# Patient Record
Sex: FEMALE | Race: WHITE | NOT HISPANIC OR LATINO | Employment: UNEMPLOYED | URBAN - NONMETROPOLITAN AREA
[De-identification: names, ages, dates, MRNs, and addresses within clinical notes are randomized per-mention and may not be internally consistent; named-entity substitution may affect disease eponyms.]

---

## 2020-06-11 ENCOUNTER — TRANSCRIBE ORDERS (OUTPATIENT)
Dept: ADMINISTRATIVE | Facility: HOSPITAL | Age: 60
End: 2020-06-11

## 2020-06-11 DIAGNOSIS — Z01.818 PREOP TESTING: Primary | ICD-10-CM

## 2020-06-16 ENCOUNTER — LAB (OUTPATIENT)
Dept: LAB | Facility: HOSPITAL | Age: 60
End: 2020-06-16

## 2020-06-16 PROCEDURE — U0003 INFECTIOUS AGENT DETECTION BY NUCLEIC ACID (DNA OR RNA); SEVERE ACUTE RESPIRATORY SYNDROME CORONAVIRUS 2 (SARS-COV-2) (CORONAVIRUS DISEASE [COVID-19]), AMPLIFIED PROBE TECHNIQUE, MAKING USE OF HIGH THROUGHPUT TECHNOLOGIES AS DESCRIBED BY CMS-2020-01-R: HCPCS | Performed by: PAIN MEDICINE

## 2020-06-17 LAB
COVID LABCORP PRIORITY: NORMAL
SARS-COV-2 RNA RESP QL NAA+PROBE: NOT DETECTED

## 2020-08-31 ENCOUNTER — TRANSCRIBE ORDERS (OUTPATIENT)
Dept: ADMINISTRATIVE | Facility: HOSPITAL | Age: 60
End: 2020-08-31

## 2020-08-31 DIAGNOSIS — Z01.818 PREOP TESTING: Primary | ICD-10-CM

## 2023-07-03 PROBLEM — I50.9 CHRONIC CONGESTIVE HEART FAILURE: Status: ACTIVE | Noted: 2023-07-03

## 2023-07-03 PROBLEM — E78.2 MIXED HYPERLIPIDEMIA: Status: ACTIVE | Noted: 2023-07-03

## 2023-07-03 PROBLEM — R06.09 DYSPNEA ON EXERTION: Status: ACTIVE | Noted: 2023-07-03

## 2023-08-11 ENCOUNTER — OFFICE VISIT (OUTPATIENT)
Dept: CARDIOLOGY | Facility: CLINIC | Age: 63
End: 2023-08-11
Payer: COMMERCIAL

## 2023-08-11 VITALS
SYSTOLIC BLOOD PRESSURE: 120 MMHG | HEIGHT: 64 IN | WEIGHT: 224 LBS | OXYGEN SATURATION: 98 % | HEART RATE: 87 BPM | BODY MASS INDEX: 38.24 KG/M2 | DIASTOLIC BLOOD PRESSURE: 62 MMHG

## 2023-08-11 DIAGNOSIS — I50.9 CHRONIC CONGESTIVE HEART FAILURE, UNSPECIFIED HEART FAILURE TYPE: ICD-10-CM

## 2023-08-11 DIAGNOSIS — R06.09 DYSPNEA ON EXERTION: ICD-10-CM

## 2023-08-11 DIAGNOSIS — E78.2 MIXED HYPERLIPIDEMIA: ICD-10-CM

## 2023-08-11 DIAGNOSIS — I50.21 ACUTE SYSTOLIC CHF (CONGESTIVE HEART FAILURE): Primary | ICD-10-CM

## 2023-08-11 PROCEDURE — 99214 OFFICE O/P EST MOD 30 MIN: CPT | Performed by: EMERGENCY MEDICINE

## 2023-08-11 RX ORDER — ASPIRIN 81 MG/1
81 TABLET ORAL DAILY
COMMUNITY

## 2023-08-11 RX ORDER — SACUBITRIL AND VALSARTAN 24; 26 MG/1; MG/1
1 TABLET, FILM COATED ORAL 2 TIMES DAILY
Qty: 180 TABLET | Refills: 3 | Status: SHIPPED | OUTPATIENT
Start: 2023-08-11

## 2023-08-11 NOTE — PROGRESS NOTES
Subjective:     Encounter Date:08/11/2023      Patient ID: Tory Shaw is a 62 y.o. female.    Chief Complaint:  History of Present Illness    Tory Shaw is a 62-year-old female patient who presents to the clinic today for 1-month follow-up for congestive heart failure. She is accompanied by an adult female.     Ms. Shaw initially presented on 06/30/2023 due to chest pain, dyspnea, and dizziness and lightheaded with movement. She had a prior stay at [facility] for wheezing and dyspnea. She underwent testing at that time and was told her heart was stable and she was treated for asthma. Following her hospital stay, lab work completed by her primary care provider suggested pericardial perfusion. She has been feeling good since her last visit.     She underwent an echocardiogram on 07/21/2023 which was discussed in detail with her today. Her ejection fraction is 40 to 45 percent and reveals both systolic and diastolic heart failure. Her stress test done at [facility] showed low risk test but her ejection fraction before stress was good and then after the stress dropped from 60s down into the 40s percent.      She is still experiencing chest pain with pressure and squeezing when she is active. She will have to sit and wait for it to finally resolve. She notes that sometimes when she is eating it feels like her food gets stuck and she gets a real sharp pain and feels like she is having a heart attack.     She reports that she gets tired easily and experiences shortness of breath. She notes that she walks a lot and has to stop and rest because she gets winded and tired.     She had recent lab work done by her primary care provider and her cholesterol levels were within normal ranges.     At her last visit she was prescribed Coreg 3.125 mg twice daily and [id number] mg daily. She utilizes valsartan 80 mg taking 2 tablets once daily, as well as aspirin 81 mg  once daily. She also takes Lipitor 10 mg daily for hyperlipidemia.         Review of Systems   Constitutional: Negative for diaphoresis, fever and malaise/fatigue.        Tires easily.   HENT:  Negative for congestion.    Eyes:  Negative for vision loss in left eye and vision loss in right eye.   Cardiovascular:  Positive for chest pain and dyspnea on exertion. Negative for claudication, irregular heartbeat, leg swelling, orthopnea, palpitations and syncope.   Respiratory:  Negative for cough, shortness of breath and wheezing.    Hematologic/Lymphatic: Negative for adenopathy.   Skin:  Negative for rash.   Musculoskeletal:  Negative for joint pain and joint swelling.   Gastrointestinal:  Negative for abdominal pain, diarrhea, nausea and vomiting.   Neurological:  Negative for excessive daytime sleepiness, dizziness, focal weakness, light-headedness, numbness and weakness.   Psychiatric/Behavioral:  Negative for depression. The patient does not have insomnia.          Current Outpatient Medications:     atorvastatin (LIPITOR) 10 MG tablet, 1 tablet., Disp: , Rfl:     carvedilol (COREG) 3.125 MG tablet, Take 1 tablet by mouth 2 (Two) Times a Day., Disp: 60 tablet, Rfl: 11    escitalopram (LEXAPRO) 10 MG tablet, Take 1 tablet by mouth Daily., Disp: , Rfl:     HYDROcodone-acetaminophen (NORCO)  MG per tablet, Take 1 tablet every 8 hours by oral route., Disp: , Rfl:     spironolactone (ALDACTONE) 25 MG tablet, Take 1 tablet by mouth Daily., Disp: 90 tablet, Rfl: 3    Symbicort 160-4.5 MCG/ACT inhaler, , Disp: , Rfl:     aspirin 81 MG EC tablet, Take 1 tablet by mouth Daily., Disp: , Rfl:     sacubitril-valsartan (Entresto) 24-26 MG tablet, Take 1 tablet by mouth 2 (Two) Times a Day., Disp: 180 tablet, Rfl: 3       Objective:      Vitals:    08/11/23 1032   BP: 120/62   Pulse: 87   SpO2: 98%     Vitals and nursing note reviewed.   Constitutional:       Appearance: Normal and healthy appearance. Well-developed and  not in distress.   Eyes:      Extraocular Movements: Extraocular movements intact.      Pupils: Pupils are equal, round, and reactive to light.   HENT:      Head: Normocephalic and atraumatic.    Mouth/Throat:      Pharynx: Oropharynx is clear.   Neck:      Vascular: JVD normal.      Trachea: Trachea normal.   Pulmonary:      Effort: Pulmonary effort is normal.      Breath sounds: Normal breath sounds. No wheezing. No rhonchi. No rales.   Cardiovascular:      PMI at left midclavicular line. Normal rate. Regular rhythm. Normal S1. Normal S2.       Murmurs: There is a grade 2/6 systolic murmur.      No gallop.  No click. No rub.   Pulses:     Dorsalis pedis: 2+ bilaterally.     Posterior tibial: 2+ bilaterally.  Abdominal:      General: Bowel sounds are normal.      Palpations: Abdomen is soft.      Tenderness: There is no abdominal tenderness.   Musculoskeletal: Normal range of motion.      Cervical back: Normal range of motion and neck supple. Skin:     General: Skin is warm and dry.      Capillary Refill: Capillary refill takes less than 2 seconds.   Feet:      Right foot:      Skin integrity: Skin integrity normal.      Left foot:      Skin integrity: Skin integrity normal.   Neurological:      Mental Status: Alert and oriented to person, place and time.      Cranial Nerves: Cranial nerves are intact.      Sensory: Sensation is intact.      Motor: Motor function is intact.      Coordination: Coordination is intact.   Psychiatric:         Speech: Speech normal.         Behavior: Behavior is cooperative.       Lab Review:       Procedures      Assessment/Plan:     Problem List Items Addressed This Visit          Cardiac and Vasculature    Dyspnea on exertion    Mixed hyperlipidemia    Chronic congestive heart failure    Relevant Medications    sacubitril-valsartan (Entresto) 24-26 MG tablet    Acute systolic CHF (congestive heart failure) - Primary    Relevant Medications    sacubitril-valsartan (Entresto) 24-26 MG  tablet    Other Relevant Orders    Case Request Cath Lab: Left Heart Cath (Completed)       1. Acute systolic congestive heart failure, primary  2. Chest pain  3.Dyspnea with exertion  4. Hyperlipidemia    The patient's recent echocardiogram showed left ventricular systolic function is mildly decreased. Left ventricular ejection fraction appears to be 41 - 45%. The left ventricular cavity is mildly dilated. Left ventricular wall thickness is consistent with mild concentric hypertrophy. There is left ventricular global hypokinesis noted. Left ventricular diastolic function is consistent with (grade I) impaired relaxation. The left atrial cavity is mildly dilated. No hemodynamically significant valvular disease. Her prior stress test from [facility] showed low risk test, however, her ejection fraction before stress was good and in the range of 60 percent, but after the stress dropped into the 40s percent. I feel there could be a possible blockage. Discussed cardiac catheterization with her today. The patient has consented to the left heart catheterization and has been scheduled for Friday, 08/18/2023.     She will continue Coreg 3.125 mg twice daily and spironolactone (Aldactone) 25 mg daily. She will discontinue valsartan. She will start Entresto. She was given a 3-month supply of samples for Entresto 49-51 mg in-office today. She will start with a lower dose, so she will cut these in half and take 0.5 tablet in the morning and 0.5 tablet at night. A prescription has been sent for sacubitril-valsartan (Entresto) 24-26 mg tablet to be taken twice daily. She will continue aspirin 81 mg daily and Lipitor 10 mg daily.       Recommendations/plans:  Patient will follow up in 1 month.    Transcribed from ambient dictation for Elton Key DO by Brigitte Manning.  08/11/23   12:40 CDT    Patient or patient representative verbalized consent to the visit recording.  I have personally performed the  services described in this document as transcribed by the above individual, and it is both accurate and complete.  Elton Key,   8/22/2023  16:11 CDT

## 2023-08-18 ENCOUNTER — HOSPITAL ENCOUNTER (OUTPATIENT)
Facility: HOSPITAL | Age: 63
Setting detail: HOSPITAL OUTPATIENT SURGERY
Discharge: HOME | End: 2023-08-18
Attending: EMERGENCY MEDICINE | Admitting: EMERGENCY MEDICINE
Payer: COMMERCIAL

## 2023-08-18 VITALS
WEIGHT: 224.87 LBS | BODY MASS INDEX: 38.39 KG/M2 | OXYGEN SATURATION: 99 % | HEART RATE: 94 BPM | SYSTOLIC BLOOD PRESSURE: 106 MMHG | HEIGHT: 64 IN | TEMPERATURE: 98.3 F | RESPIRATION RATE: 18 BRPM | DIASTOLIC BLOOD PRESSURE: 74 MMHG

## 2023-08-18 DIAGNOSIS — R00.2 PALPITATIONS: Primary | ICD-10-CM

## 2023-08-18 DIAGNOSIS — I50.21 ACUTE SYSTOLIC CHF (CONGESTIVE HEART FAILURE): ICD-10-CM

## 2023-08-18 LAB
ANION GAP SERPL CALCULATED.3IONS-SCNC: 11 MMOL/L (ref 5–15)
BUN SERPL-MCNC: 16 MG/DL (ref 8–23)
BUN/CREAT SERPL: 22.9 (ref 7–25)
CALCIUM SPEC-SCNC: 9.6 MG/DL (ref 8.6–10.5)
CHLORIDE SERPL-SCNC: 102 MMOL/L (ref 98–107)
CO2 SERPL-SCNC: 23 MMOL/L (ref 22–29)
CREAT SERPL-MCNC: 0.7 MG/DL (ref 0.57–1)
DEPRECATED RDW RBC AUTO: 45.2 FL (ref 37–54)
EGFRCR SERPLBLD CKD-EPI 2021: 97.3 ML/MIN/1.73
ERYTHROCYTE [DISTWIDTH] IN BLOOD BY AUTOMATED COUNT: 13.5 % (ref 12.3–15.4)
GLUCOSE SERPL-MCNC: 100 MG/DL (ref 65–99)
HCT VFR BLD AUTO: 38.9 % (ref 34–46.6)
HGB BLD-MCNC: 12.1 G/DL (ref 12–15.9)
INR PPP: 0.91 (ref 0.91–1.09)
MCH RBC QN AUTO: 28.6 PG (ref 26.6–33)
MCHC RBC AUTO-ENTMCNC: 31.1 G/DL (ref 31.5–35.7)
MCV RBC AUTO: 92 FL (ref 79–97)
PLATELET # BLD AUTO: 416 10*3/MM3 (ref 140–450)
PMV BLD AUTO: 9.7 FL (ref 6–12)
POTASSIUM SERPL-SCNC: 4.5 MMOL/L (ref 3.5–5.2)
PROTHROMBIN TIME: 12.3 SECONDS (ref 11.8–14.8)
RBC # BLD AUTO: 4.23 10*6/MM3 (ref 3.77–5.28)
SODIUM SERPL-SCNC: 136 MMOL/L (ref 136–145)
WBC NRBC COR # BLD: 7.93 10*3/MM3 (ref 3.4–10.8)

## 2023-08-18 PROCEDURE — 25010000002 DIPHENHYDRAMINE PER 50 MG: Performed by: EMERGENCY MEDICINE

## 2023-08-18 PROCEDURE — 80048 BASIC METABOLIC PNL TOTAL CA: CPT | Performed by: EMERGENCY MEDICINE

## 2023-08-18 PROCEDURE — 25510000001 IOPAMIDOL PER 1 ML: Performed by: EMERGENCY MEDICINE

## 2023-08-18 PROCEDURE — 25010000002 FENTANYL CITRATE (PF) 50 MCG/ML SOLUTION: Performed by: EMERGENCY MEDICINE

## 2023-08-18 PROCEDURE — 85610 PROTHROMBIN TIME: CPT | Performed by: EMERGENCY MEDICINE

## 2023-08-18 PROCEDURE — 93458 L HRT ARTERY/VENTRICLE ANGIO: CPT | Performed by: EMERGENCY MEDICINE

## 2023-08-18 PROCEDURE — 85027 COMPLETE CBC AUTOMATED: CPT | Performed by: EMERGENCY MEDICINE

## 2023-08-18 PROCEDURE — 99152 MOD SED SAME PHYS/QHP 5/>YRS: CPT | Performed by: EMERGENCY MEDICINE

## 2023-08-18 PROCEDURE — C1769 GUIDE WIRE: HCPCS | Performed by: EMERGENCY MEDICINE

## 2023-08-18 PROCEDURE — 25010000002 MIDAZOLAM PER 1 MG: Performed by: EMERGENCY MEDICINE

## 2023-08-18 PROCEDURE — C1894 INTRO/SHEATH, NON-LASER: HCPCS | Performed by: EMERGENCY MEDICINE

## 2023-08-18 PROCEDURE — 25010000002 HEPARIN (PORCINE) 2000-0.9 UNIT/L-% SOLUTION: Performed by: EMERGENCY MEDICINE

## 2023-08-18 PROCEDURE — 99153 MOD SED SAME PHYS/QHP EA: CPT | Performed by: EMERGENCY MEDICINE

## 2023-08-18 PROCEDURE — 25010000002 HEPARIN (PORCINE) PER 1000 UNITS: Performed by: EMERGENCY MEDICINE

## 2023-08-18 RX ORDER — HEPARIN SODIUM 1000 [USP'U]/ML
INJECTION, SOLUTION INTRAVENOUS; SUBCUTANEOUS
Status: DISCONTINUED | OUTPATIENT
Start: 2023-08-18 | End: 2023-08-18 | Stop reason: HOSPADM

## 2023-08-18 RX ORDER — LIDOCAINE HYDROCHLORIDE 20 MG/ML
INJECTION, SOLUTION INFILTRATION; PERINEURAL
Status: DISCONTINUED | OUTPATIENT
Start: 2023-08-18 | End: 2023-08-18 | Stop reason: HOSPADM

## 2023-08-18 RX ORDER — DIPHENHYDRAMINE HYDROCHLORIDE 50 MG/ML
INJECTION INTRAMUSCULAR; INTRAVENOUS
Status: DISCONTINUED | OUTPATIENT
Start: 2023-08-18 | End: 2023-08-18 | Stop reason: HOSPADM

## 2023-08-18 RX ORDER — HEPARIN SODIUM 200 [USP'U]/100ML
INJECTION, SOLUTION INTRAVENOUS
Status: DISCONTINUED | OUTPATIENT
Start: 2023-08-18 | End: 2023-08-18 | Stop reason: HOSPADM

## 2023-08-18 RX ORDER — FENTANYL CITRATE 50 UG/ML
INJECTION, SOLUTION INTRAMUSCULAR; INTRAVENOUS
Status: DISCONTINUED | OUTPATIENT
Start: 2023-08-18 | End: 2023-08-18 | Stop reason: HOSPADM

## 2023-08-18 RX ORDER — MIDAZOLAM HYDROCHLORIDE 1 MG/ML
INJECTION INTRAMUSCULAR; INTRAVENOUS
Status: DISCONTINUED | OUTPATIENT
Start: 2023-08-18 | End: 2023-08-18 | Stop reason: HOSPADM

## 2023-08-18 RX ORDER — VERAPAMIL HYDROCHLORIDE 2.5 MG/ML
INJECTION, SOLUTION INTRAVENOUS
Status: DISCONTINUED | OUTPATIENT
Start: 2023-08-18 | End: 2023-08-18 | Stop reason: HOSPADM

## 2023-08-18 RX ORDER — NITROGLYCERIN 0.4 MG/1
0.4 TABLET SUBLINGUAL
Status: CANCELLED | OUTPATIENT
Start: 2023-08-18

## 2023-08-18 RX ORDER — SODIUM CHLORIDE 9 MG/ML
100 INJECTION, SOLUTION INTRAVENOUS CONTINUOUS
Status: CANCELLED | OUTPATIENT
Start: 2023-08-18

## 2023-08-18 RX ORDER — ACETAMINOPHEN 325 MG/1
650 TABLET ORAL EVERY 4 HOURS PRN
Status: CANCELLED | OUTPATIENT
Start: 2023-08-18

## 2023-08-18 NOTE — H&P
Patient seen and examined at bedside.  The history and physical not changed as below.    We will be performing a diagnostic left heart catheterization today with possible intervention based on findings.    Risk, benefits and alternatives were explained to the patient and she wished to proceed.      Patient ID: Tory Shaw is a 62 y.o. female.     Chief Complaint:  History of Present Illness     Tory Shaw is a 62-year-old female patient who presents to the clinic today for 1-month follow-up for congestive heart failure. She is accompanied by an adult female.      Ms. Shaw initially presented on 06/30/2023 due to chest pain, dyspnea, and dizziness and lightheaded with movement. She had a prior stay at [facility] for wheezing and dyspnea. She underwent testing at that time and was told her heart was stable and she was treated for asthma. Following her hospital stay, lab work completed by her primary care provider suggested pericardial perfusion. She has been feeling good since her last visit.      She underwent an echocardiogram on 07/21/2023 which was discussed in detail with her today. Her ejection fraction is 40 to 45 percent and reveals both systolic and diastolic heart failure. Her stress test done at [facility] showed low risk test but her ejection fraction before stress was good and then after the stress dropped from 60s down into the 40s percent.       She is still experiencing chest pain with pressure and squeezing when she is active. She will have to sit and wait for it to finally resolve. She notes that sometimes when she is eating it feels like her food gets stuck and she gets a real sharp pain and feels like she is having a heart attack.      She reports that she gets tired easily and experiences shortness of breath. She notes that she walks a lot and has to stop and rest because she gets winded and tired.      She had recent lab work done by her primary  care provider and her cholesterol levels were within normal ranges.      At her last visit she was prescribed Coreg 3.125 mg twice daily and [id number] mg daily. She utilizes valsartan 80 mg taking 2 tablets once daily, as well as aspirin 81 mg once daily. She also takes Lipitor 10 mg daily for hyperlipidemia.            Review of Systems   Constitutional: Negative for diaphoresis, fever and malaise/fatigue.        Tires easily.   HENT:  Negative for congestion.    Eyes:  Negative for vision loss in left eye and vision loss in right eye.   Cardiovascular:  Positive for chest pain and dyspnea on exertion. Negative for claudication, irregular heartbeat, leg swelling, orthopnea, palpitations and syncope.   Respiratory:  Negative for cough, shortness of breath and wheezing.    Hematologic/Lymphatic: Negative for adenopathy.   Skin:  Negative for rash.   Musculoskeletal:  Negative for joint pain and joint swelling.   Gastrointestinal:  Negative for abdominal pain, diarrhea, nausea and vomiting.   Neurological:  Negative for excessive daytime sleepiness, dizziness, focal weakness, light-headedness, numbness and weakness.   Psychiatric/Behavioral:  Negative for depression. The patient does not have insomnia.             Current Outpatient Medications:     atorvastatin (LIPITOR) 10 MG tablet, 1 tablet., Disp: , Rfl:     carvedilol (COREG) 3.125 MG tablet, Take 1 tablet by mouth 2 (Two) Times a Day., Disp: 60 tablet, Rfl: 11    escitalopram (LEXAPRO) 10 MG tablet, Take 1 tablet by mouth Daily., Disp: , Rfl:     HYDROcodone-acetaminophen (NORCO)  MG per tablet, Take 1 tablet every 8 hours by oral route., Disp: , Rfl:     spironolactone (ALDACTONE) 25 MG tablet, Take 1 tablet by mouth Daily., Disp: 90 tablet, Rfl: 3    Symbicort 160-4.5 MCG/ACT inhaler, , Disp: , Rfl:     aspirin 81 MG EC tablet, Take 1 tablet by mouth Daily., Disp: , Rfl:     sacubitril-valsartan (Entresto) 24-26 MG tablet, Take 1 tablet by mouth  2 (Two) Times a Day., Disp: 180 tablet, Rfl: 3           Objective:      Objective          Vitals:     08/11/23 1032   BP: 120/62   Pulse: 87   SpO2: 98%      Vitals and nursing note reviewed.   Constitutional:       Appearance: Normal and healthy appearance. Well-developed and not in distress.   Eyes:      Extraocular Movements: Extraocular movements intact.      Pupils: Pupils are equal, round, and reactive to light.   HENT:      Head: Normocephalic and atraumatic.    Mouth/Throat:      Pharynx: Oropharynx is clear.   Neck:      Vascular: JVD normal.      Trachea: Trachea normal.   Pulmonary:      Effort: Pulmonary effort is normal.      Breath sounds: Normal breath sounds. No wheezing. No rhonchi. No rales.   Cardiovascular:      PMI at left midclavicular line. Normal rate. Regular rhythm. Normal S1. Normal S2.       Murmurs: There is a grade 2/6 systolic murmur.      No gallop.  No click. No rub.   Pulses:     Dorsalis pedis: 2+ bilaterally.     Posterior tibial: 2+ bilaterally.  Abdominal:      General: Bowel sounds are normal.      Palpations: Abdomen is soft.      Tenderness: There is no abdominal tenderness.   Musculoskeletal: Normal range of motion.      Cervical back: Normal range of motion and neck supple. Skin:     General: Skin is warm and dry.      Capillary Refill: Capillary refill takes less than 2 seconds.   Feet:      Right foot:      Skin integrity: Skin integrity normal.      Left foot:      Skin integrity: Skin integrity normal.   Neurological:      Mental Status: Alert and oriented to person, place and time.      Cranial Nerves: Cranial nerves are intact.      Sensory: Sensation is intact.      Motor: Motor function is intact.      Coordination: Coordination is intact.   Psychiatric:         Speech: Speech normal.         Behavior: Behavior is cooperative.         Lab Review:         Procedures        Assessment/Plan:      Problem List Items Addressed This Visit                  Cardiac and  Vasculature     Dyspnea on exertion     Mixed hyperlipidemia     Chronic congestive heart failure     Relevant Medications     sacubitril-valsartan (Entresto) 24-26 MG tablet     Acute systolic CHF (congestive heart failure) - Primary     Relevant Medications     sacubitril-valsartan (Entresto) 24-26 MG tablet     Other Relevant Orders     Case Request Cath Lab: Left Heart Cath (Completed)         1. Acute systolic congestive heart failure, primary  2. Chest pain  3.Dyspnea with exertion  4. Hyperlipidemia     The patient's recent echocardiogram showed left ventricular systolic function is mildly decreased. Left ventricular ejection fraction appears to be 41 - 45%. The left ventricular cavity is mildly dilated. Left ventricular wall thickness is consistent with mild concentric hypertrophy. There is left ventricular global hypokinesis noted. Left ventricular diastolic function is consistent with (grade I) impaired relaxation. The left atrial cavity is mildly dilated. No hemodynamically significant valvular disease. Her prior stress test from [facility] showed low risk test, however, her ejection fraction before stress was good and in the range of 60 percent, but after the stress dropped into the 40s percent. I feel there could be a possible blockage. Discussed cardiac catheterization with her today. The patient has consented to the left heart catheterization and has been scheduled for Friday, 08/18/2023.      She will continue Coreg 3.125 mg twice daily and spironolactone (Aldactone) 25 mg daily. She will discontinue valsartan. She will start Entresto. She was given a 3-month supply of samples for Entresto 49-51 mg in-office today. She will start with a lower dose, so she will cut these in half and take 0.5 tablet in the morning and 0.5 tablet at night. A prescription has been sent for sacubitril-valsartan (Entresto) 24-26 mg tablet to be taken twice daily. She will continue aspirin 81 mg daily  and Lipitor 10 mg daily.         Recommendations/plans:  Patient will follow up in 1 month.     Transcribed from ambient dictation for Elton Key DO by Brigitte Manning.  08/11/23   12:40 CDT     Patient or patient representative verbalized consent to the visit recording.  I have personally performed the services described in this document as transcribed by the above individual, and it is both accurate and complete.  Elton Key DO  8/22/2023  16:11 CDT

## 2023-08-18 NOTE — Clinical Note
Hemostasis started on the right radial artery. Radial compression device applied to vessel. Hemostasis achieved successfully.

## 2023-08-18 NOTE — OP NOTE
[facility]    Date of procedure: 8/18/2023     Procedures performed:     1.  Access to the right radial artery via modified Seldinger technique  2.  Left heart catheterization with retrograde crossing rebound to the left ventricle  3.  LV ventriculography  4.  Selective bilateral coronary angiography  5.  Conscious sedation with continuous hemodynamic monitoring for 25 minutes  6.  Patent hemostasis achieved in the right radial artery access site using a TR band        Risk, Benefits, and Alternatives discussed with the patient and/or family.  Plan is for moderate sedation, and the patient agrees to proceed with the procedure.  An immediate assessment was done prior to the administration of moderate sedation        Indication: New onset systolic heart failure  Premedication: Versed, Fentanyl  Contrast: Isovue 114 cc  Radiation: Flouro time= 4.2 minutes. Air Kerma= 253 mGy  Catheters: 5F TIG, pigtail    Procedural details:    The patient was brought to the cath lab and prepped and draped in the usual fashion.  Access was obtained in the right radial artery using a modified Seldinger technique.  A 6 [place] sheath was placed into the artery and flushed.  Next, TIG catheter was inserted and used to engage the left main and right coronary arteries and selective bilateral coronary angiography was performed in multiple views.  Next, pigtail catheter was inserted and used to cross aortic valve to the left ventricle pressure recorded LV ventriculography was performed.  This catheter was then withdrawn back across the aortic valve and again pressures were recorded.  Everything was then withdrawn through the sheath and the sheath was flushed.  Patent hemostasis was achieved in the right radial artery access site using a TR band.  Patient tolerated procedure well without any complications.  She left the Cath Lab in stable condition.      I supervised the administration of conscious sedation by nursing  staff throughout the case.  First dose was given at 1543 and the end of my face-to-face encounter was at 1610, accounting for a total of 25 minutes of supervision.  During the case, continuous pulse oximetry, heart rate, blood pressure, and patient status were monitored.     Findings:    Hemodynamics:    Please see dedicated hemodynamics report for pressures    Left ventriculography:  1. The contractility of the left ventricle is mildly reduced.  Estimated ejection fraction 45%.  2.  No significant gradient across the aortic valve on pullback    Selective coronary angiography:     Left main: Left main is a large-caliber vessel that bifurcates into the LAD and left circumflex coronary arteries, no angiographic evidence of stenosis, ANGEL LUIS-3 flow    LAD: The LAD is a large-caliber vessel with mild disease noted in the midsegment, ANGEL LUIS-3 flow    Diagonals: Mild to moderate caliber vessels with no significant disease    Left circumflex: Large caliber vessel with no angiographic evidence of stenosis, ANGEL LUIS-3 flow    Obtuse marginals: The first OM is moderate caliber with no significant disease, the second OM is large caliber with no significant disease    RCA: Large caliber vessel with no significant disease, ANGEL LUIS-3 flow    PDA/BALDOMERO: Moderate caliber with no significant disease      Estimated Blood Loss: Minimal    Specimens: None    Complications: None     Impression:  1.  Mild, nonobstructive coronary artery disease as described above  2.  Systolic heart failure, not secondary to ischemia  3.  Hyperlipidemia  4.  Episodes of chest pain and dyspnea on exertion     Plan:   1.  TR band off in 2 hours, discharge home later today  2.  Follow-up in the office to continue to optimize her medical regimen for her systolic CHF    Elton Key,   Interventional cardiology  [facility]  August 18, 2023

## 2023-08-22 PROBLEM — I50.21 ACUTE SYSTOLIC CHF (CONGESTIVE HEART FAILURE): Status: ACTIVE | Noted: 2023-08-22

## 2023-10-05 ENCOUNTER — OFFICE VISIT (OUTPATIENT)
Dept: CARDIOLOGY | Facility: CLINIC | Age: 63
End: 2023-10-05
Payer: COMMERCIAL

## 2023-10-05 VITALS
DIASTOLIC BLOOD PRESSURE: 80 MMHG | HEIGHT: 64 IN | HEART RATE: 93 BPM | SYSTOLIC BLOOD PRESSURE: 162 MMHG | WEIGHT: 234 LBS | BODY MASS INDEX: 39.95 KG/M2 | OXYGEN SATURATION: 98 %

## 2023-10-05 DIAGNOSIS — I50.21 ACUTE SYSTOLIC CHF (CONGESTIVE HEART FAILURE): Primary | ICD-10-CM

## 2023-10-05 DIAGNOSIS — R06.09 DYSPNEA ON EXERTION: ICD-10-CM

## 2023-10-05 DIAGNOSIS — E78.2 MIXED HYPERLIPIDEMIA: ICD-10-CM

## 2023-10-05 DIAGNOSIS — I50.9 CHRONIC CONGESTIVE HEART FAILURE, UNSPECIFIED HEART FAILURE TYPE: ICD-10-CM

## 2023-10-05 PROCEDURE — 99213 OFFICE O/P EST LOW 20 MIN: CPT | Performed by: EMERGENCY MEDICINE

## 2023-10-05 RX ORDER — FUROSEMIDE 20 MG/1
20 TABLET ORAL DAILY
Qty: 30 TABLET | Refills: 11 | Status: SHIPPED | OUTPATIENT
Start: 2023-10-05

## 2023-10-05 RX ORDER — AMIODARONE HYDROCHLORIDE 200 MG/1
200 TABLET ORAL DAILY
Qty: 90 TABLET | Refills: 1 | Status: SHIPPED | OUTPATIENT
Start: 2023-10-05

## 2023-10-05 NOTE — PROGRESS NOTES
Subjective:     Encounter Date:10/05/2023      Patient ID: Tory Shaw is a 63 y.o. female.    Chief Complaint:  History of Present Illness    Tory Shaw is a 63-year-old female who presents today for a follow up.    The patient reports she is feeling well but continues to have chest pains occasionally with exertion. She confirms she continues to experince shortness of breath. She denies any blockages were discovered with her recent heart catheterization.    She denies feeling when she is having palpitations and denies lower extremity edema. The patient confirms her current medication regimen has helped with strengthening her heart.    She denies the need for any refills at this time.        Review of Systems   Constitutional: Negative for diaphoresis, fever and malaise/fatigue.   HENT:  Negative for congestion.    Eyes:  Negative for vision loss in left eye and vision loss in right eye.   Cardiovascular:  Positive for chest pain, dyspnea on exertion and palpitations. Negative for claudication, irregular heartbeat, leg swelling, orthopnea and syncope.   Respiratory:  Positive for shortness of breath. Negative for cough and wheezing.    Hematologic/Lymphatic: Negative for adenopathy.   Skin:  Negative for rash.   Musculoskeletal:  Negative for joint pain and joint swelling.   Gastrointestinal:  Negative for abdominal pain, diarrhea, nausea and vomiting.   Neurological:  Negative for excessive daytime sleepiness, dizziness, focal weakness, light-headedness, numbness and weakness.   Psychiatric/Behavioral:  Negative for depression. The patient does not have insomnia.            Current Outpatient Medications:     aspirin 81 MG EC tablet, Take 1 tablet by mouth Daily., Disp: , Rfl:     atorvastatin (LIPITOR) 10 MG tablet, 1 tablet., Disp: , Rfl:     carvedilol (COREG) 3.125 MG tablet, Take 1 tablet by mouth 2 (Two) Times a Day., Disp: 60 tablet, Rfl: 11    escitalopram (LEXAPRO) 10 MG tablet, Take 1 tablet  by mouth Daily., Disp: , Rfl:     HYDROcodone-acetaminophen (NORCO)  MG per tablet, Take 1 tablet every 8 hours by oral route., Disp: , Rfl:     sacubitril-valsartan (Entresto) 24-26 MG tablet, Take 1 tablet by mouth 2 (Two) Times a Day., Disp: 180 tablet, Rfl: 3    spironolactone (ALDACTONE) 25 MG tablet, Take 1 tablet by mouth Daily., Disp: 90 tablet, Rfl: 3    Symbicort 160-4.5 MCG/ACT inhaler, , Disp: , Rfl:     amiodarone (PACERONE) 200 MG tablet, Take 1 tablet by mouth Daily., Disp: 90 tablet, Rfl: 1    furosemide (LASIX) 20 MG tablet, Take 1 tablet by mouth Daily., Disp: 30 tablet, Rfl: 11       Objective:      Vitals:    10/05/23 0946   BP: 162/80   Pulse: 93   SpO2: 98%     Vitals and nursing note reviewed.   Constitutional:       Appearance: Normal and healthy appearance. Well-developed and not in distress.   Eyes:      Extraocular Movements: Extraocular movements intact.      Pupils: Pupils are equal, round, and reactive to light.   HENT:      Head: Normocephalic and atraumatic.    Mouth/Throat:      Pharynx: Oropharynx is clear.   Neck:      Vascular: JVD normal.      Trachea: Trachea normal.   Pulmonary:      Effort: Pulmonary effort is normal.      Breath sounds: Normal breath sounds. No wheezing. No rhonchi. No rales.   Cardiovascular:      PMI at left midclavicular line. Normal rate. Regular rhythm. Normal S1. Normal S2.       Murmurs: There is no murmur. There is a grade 2/6 murmur.      No gallop.  No click. No rub.   Pulses:     Intact distal pulses.      Dorsalis pedis: 2+ bilaterally.     Posterior tibial: 2+ bilaterally.  Edema:     Peripheral edema absent.   Abdominal:      General: Bowel sounds are normal.      Palpations: Abdomen is soft.      Tenderness: There is no abdominal tenderness.   Musculoskeletal: Normal range of motion.      Cervical back: Normal range of motion and neck supple. Skin:     General: Skin is warm and dry.      Capillary Refill: Capillary refill takes less than  2 seconds.   Feet:      Right foot:      Skin integrity: Skin integrity normal.      Left foot:      Skin integrity: Skin integrity normal.   Neurological:      Mental Status: Alert and oriented to person, place and time.      Sensory: Sensation is intact.      Motor: Motor function is intact.      Coordination: Coordination is intact.   Psychiatric:         Speech: Speech normal.         Behavior: Behavior is cooperative.         Lab Review:       Procedures      Assessment/Plan:     Problem List Items Addressed This Visit          Cardiac and Vasculature    Dyspnea on exertion    Mixed hyperlipidemia    Chronic congestive heart failure    Acute systolic CHF (congestive heart failure) - Primary    Overview     Added automatically from request for surgery [id number]         Relevant Orders    Adult Transthoracic Echo Complete W/ Cont if Necessary Per Protocol     Cardiac health maintenance  - The patient presents today for a 1-month follow up for congestive heart failure. The patient underwent heart catheterization in 08/2023 and her results were reviewed and discussed with her and indicated some mild coronary artery disease.     She also wore a Holter monitor for 6 days in which the results indicated she experinced 15,000 extra atrial beats, 12,000 extra ventricular beats, and 3500 episodes and was experiencing SVT's, PACs and PVCs during her episodes with a heart rate as high as 214 bpm. Her monitor results were reviewed and discussed with her in full detail.    An order was placed for an echocardiogram for the patient to schedule at her convenience.    She is currently prescribed aspirin, Lipitor, Coreg, Entresto and spironolactone. An order was placed for prescriptions for amiodarone once daily and Lasix to take as needed for edema.    The patient will follow-up in 01/2024.      Recommendations/plans:    Transcribed from ambient dictation for Elton Key DO by Ta Bernard.  10/05/23   11:12 CDT    Patient or  patient representative verbalized consent to the visit recording.  I have personally performed the services described in this document as transcribed by the above individual, and it is both accurate and complete.  Elton Key DO  10/12/2023  19:02 CDT

## 2023-10-19 ENCOUNTER — TELEPHONE (OUTPATIENT)
Dept: CARDIOLOGY | Facility: CLINIC | Age: 63
End: 2023-10-19

## 2023-10-19 NOTE — TELEPHONE ENCOUNTER
Peer to peer on echo denial on 10/31/23 today 10/18/23 10:45 am w Dr Jelani Lloyd .#[id number]                                     Thank you!                                   Estela

## 2023-10-31 ENCOUNTER — HOSPITAL ENCOUNTER (OUTPATIENT)
Dept: CARDIOLOGY | Facility: HOSPITAL | Age: 63
Discharge: HOME | End: 2023-10-31
Admitting: EMERGENCY MEDICINE
Payer: COMMERCIAL

## 2023-10-31 DIAGNOSIS — I50.21 ACUTE SYSTOLIC CHF (CONGESTIVE HEART FAILURE): ICD-10-CM

## 2023-10-31 PROCEDURE — 93306 TTE W/DOPPLER COMPLETE: CPT

## 2023-11-02 LAB
BH CV ECHO MEAS - AO MAX PG: 9.9 MMHG
BH CV ECHO MEAS - AO MEAN PG: 6 MMHG
BH CV ECHO MEAS - AO ROOT DIAM: 3.3 CM
BH CV ECHO MEAS - AO V2 MAX: 157 CM/SEC
BH CV ECHO MEAS - AO V2 VTI: 33.4 CM
BH CV ECHO MEAS - AVA(I,D): 1.4 CM2
BH CV ECHO MEAS - EDV(CUBED): 195.1 ML
BH CV ECHO MEAS - EDV(MOD-SP4): 97 ML
BH CV ECHO MEAS - EF(MOD-SP4): 41.2 %
BH CV ECHO MEAS - ESV(CUBED): 97.3 ML
BH CV ECHO MEAS - ESV(MOD-SP4): 57 ML
BH CV ECHO MEAS - FS: 20.7 %
BH CV ECHO MEAS - IVS/LVPW: 1.1 CM
BH CV ECHO MEAS - IVSD: 1.1 CM
BH CV ECHO MEAS - LA DIMENSION: 3.7 CM
BH CV ECHO MEAS - LV DIASTOLIC VOL/BSA (35-75): 46.9 CM2
BH CV ECHO MEAS - LV MASS(C)D: 248.5 GRAMS
BH CV ECHO MEAS - LV MAX PG: 1.83 MMHG
BH CV ECHO MEAS - LV MEAN PG: 1 MMHG
BH CV ECHO MEAS - LV SYSTOLIC VOL/BSA (12-30): 27.6 CM2
BH CV ECHO MEAS - LV V1 MAX: 67.7 CM/SEC
BH CV ECHO MEAS - LV V1 VTI: 14.9 CM
BH CV ECHO MEAS - LVIDD: 5.8 CM
BH CV ECHO MEAS - LVIDS: 4.6 CM
BH CV ECHO MEAS - LVOT AREA: 3.1 CM2
BH CV ECHO MEAS - LVOT DIAM: 2 CM
BH CV ECHO MEAS - LVPWD: 1 CM
BH CV ECHO MEAS - MV A MAX VEL: 101 CM/SEC
BH CV ECHO MEAS - MV DEC SLOPE: 862 CM/SEC2
BH CV ECHO MEAS - MV DEC TIME: 0.2 SEC
BH CV ECHO MEAS - MV E MAX VEL: 68.6 CM/SEC
BH CV ECHO MEAS - MV E/A: 0.68
BH CV ECHO MEAS - MV P1/2T: 37 MSEC
BH CV ECHO MEAS - MVA(P1/2T): 5.9 CM2
BH CV ECHO MEAS - PA V2 MAX: 102 CM/SEC
BH CV ECHO MEAS - RAP SYSTOLE: 5 MMHG
BH CV ECHO MEAS - RV MAX PG: 3.9 MMHG
BH CV ECHO MEAS - RV V1 MAX: 98.5 CM/SEC
BH CV ECHO MEAS - RV V1 VTI: 18.2 CM
BH CV ECHO MEAS - RVSP: 47.5 MMHG
BH CV ECHO MEAS - SI(MOD-SP4): 19.3 ML/M2
BH CV ECHO MEAS - SV(LVOT): 46.8 ML
BH CV ECHO MEAS - SV(MOD-SP4): 40 ML
BH CV ECHO MEAS - TR MAX PG: 42.5 MMHG
BH CV ECHO MEAS - TR MAX VEL: 326 CM/SEC
LEFT ATRIUM VOLUME INDEX: 25.6 ML/M2

## 2024-04-18 RX ORDER — AMIODARONE HYDROCHLORIDE 200 MG/1
200 TABLET ORAL DAILY
Qty: 90 TABLET | Refills: 1 | Status: SHIPPED | OUTPATIENT
Start: 2024-04-18

## 2024-07-26 RX ORDER — CARVEDILOL 3.12 MG/1
3.12 TABLET ORAL 2 TIMES DAILY
Qty: 60 TABLET | Refills: 11 | Status: SHIPPED | OUTPATIENT
Start: 2024-07-26

## 2024-07-31 RX ORDER — SPIRONOLACTONE 25 MG/1
25 TABLET ORAL DAILY
Qty: 90 TABLET | Refills: 1 | Status: SHIPPED | OUTPATIENT
Start: 2024-07-31

## 2024-07-31 NOTE — TELEPHONE ENCOUNTER
Patient has been called to notify of follow up with clinic. Unable to see any openings on desired day needed.

## 2024-09-27 RX ORDER — AMIODARONE HYDROCHLORIDE 200 MG/1
200 TABLET ORAL DAILY
Qty: 30 TABLET | Refills: 0 | Status: SHIPPED | OUTPATIENT
Start: 2024-09-27

## 2024-10-23 RX ORDER — FUROSEMIDE 20 MG
20 TABLET ORAL DAILY
Qty: 30 TABLET | Refills: 0 | Status: SHIPPED | OUTPATIENT
Start: 2024-10-23

## 2024-11-27 RX ORDER — AMIODARONE HYDROCHLORIDE 200 MG/1
TABLET ORAL
Qty: 35 TABLET | Refills: 0 | Status: SHIPPED | OUTPATIENT
Start: 2024-11-27

## 2024-12-04 RX ORDER — AMIODARONE HYDROCHLORIDE 200 MG/1
TABLET ORAL
Refills: 0 | OUTPATIENT
Start: 2024-12-04

## 2024-12-04 RX ORDER — FUROSEMIDE 20 MG/1
TABLET ORAL
Refills: 0 | OUTPATIENT
Start: 2024-12-04

## 2024-12-30 RX ORDER — AMIODARONE HYDROCHLORIDE 200 MG/1
TABLET ORAL
Qty: 35 TABLET | Refills: 0 | Status: SHIPPED | OUTPATIENT
Start: 2024-12-30 | End: 2024-12-31 | Stop reason: SDUPTHER

## 2024-12-31 ENCOUNTER — OFFICE VISIT (OUTPATIENT)
Dept: CARDIOLOGY | Facility: CLINIC | Age: 64
End: 2024-12-31
Payer: COMMERCIAL

## 2024-12-31 VITALS
SYSTOLIC BLOOD PRESSURE: 158 MMHG | WEIGHT: 237 LBS | HEIGHT: 64 IN | HEART RATE: 70 BPM | BODY MASS INDEX: 40.46 KG/M2 | DIASTOLIC BLOOD PRESSURE: 88 MMHG | OXYGEN SATURATION: 99 %

## 2024-12-31 DIAGNOSIS — I50.42 CHRONIC COMBINED SYSTOLIC AND DIASTOLIC CONGESTIVE HEART FAILURE: Primary | ICD-10-CM

## 2024-12-31 DIAGNOSIS — R06.02 SOB (SHORTNESS OF BREATH): ICD-10-CM

## 2024-12-31 DIAGNOSIS — E78.2 MIXED HYPERLIPIDEMIA: ICD-10-CM

## 2024-12-31 PROCEDURE — 99214 OFFICE O/P EST MOD 30 MIN: CPT | Performed by: EMERGENCY MEDICINE

## 2024-12-31 RX ORDER — SPIRONOLACTONE 25 MG/1
25 TABLET ORAL DAILY
Qty: 90 TABLET | Refills: 3 | Status: SHIPPED | OUTPATIENT
Start: 2024-12-31

## 2024-12-31 RX ORDER — FUROSEMIDE 40 MG/1
40 TABLET ORAL DAILY
Qty: 90 TABLET | Refills: 3 | Status: SHIPPED | OUTPATIENT
Start: 2024-12-31

## 2024-12-31 RX ORDER — CARVEDILOL 6.25 MG/1
6.25 TABLET ORAL 2 TIMES DAILY
Qty: 180 TABLET | Refills: 3 | Status: SHIPPED | OUTPATIENT
Start: 2024-12-31

## 2024-12-31 RX ORDER — SACUBITRIL AND VALSARTAN 49; 51 MG/1; MG/1
1 TABLET, FILM COATED ORAL 2 TIMES DAILY
Qty: 180 TABLET | Refills: 3 | Status: SHIPPED | OUTPATIENT
Start: 2024-12-31

## 2024-12-31 RX ORDER — ATORVASTATIN CALCIUM 10 MG/1
10 TABLET, FILM COATED ORAL DAILY
Qty: 90 TABLET | Refills: 3 | Status: SHIPPED | OUTPATIENT
Start: 2024-12-31

## 2024-12-31 RX ORDER — AMIODARONE HYDROCHLORIDE 200 MG/1
200 TABLET ORAL DAILY
Qty: 90 TABLET | Refills: 3 | Status: SHIPPED | OUTPATIENT
Start: 2024-12-31

## 2024-12-31 RX ORDER — ASPIRIN 81 MG/1
81 TABLET ORAL DAILY
Qty: 90 TABLET | Refills: 3 | Status: SHIPPED | OUTPATIENT
Start: 2024-12-31

## 2025-01-02 PROCEDURE — 93000 ELECTROCARDIOGRAM COMPLETE: CPT | Performed by: EMERGENCY MEDICINE

## 2025-01-02 NOTE — PROGRESS NOTES
Subjective:     Encounter Date:12/31/2024      Patient ID: Tory Shaw is a 64 y.o. female.    Chief Complaint:  History of Present Illness  History of Present Illness  The patient presents for evaluation of shortness of breath, hypertension, and lower extremity edema.    She reports experiencing dyspnea, which has been a persistent issue for several years. She is uncertain about the presence of palpitations or arrhythmias. Her medication regimen includes amiodarone, baby aspirin, cholesterol medication, Coreg, Lasix, Entresto, and spironolactone. She has not yet consulted with her pulmonologist, Dr. Robert Alexander, but plans to schedule an appointment soon.    She also reports leg swelling, a symptom that has been present for a few weeks and was particularly noticeable this morning.    Her blood pressure is elevated.    MEDICATIONS  Amiodarone, baby aspirin, Coreg, Lasix, Entresto, spironolactone      Review of Systems   Constitutional: Negative for diaphoresis, fever and malaise/fatigue.   HENT:  Negative for congestion.    Eyes:  Negative for vision loss in left eye and vision loss in right eye.   Cardiovascular:  Positive for leg swelling. Negative for chest pain, claudication, dyspnea on exertion, irregular heartbeat, orthopnea, palpitations and syncope.   Respiratory:  Positive for shortness of breath. Negative for cough and wheezing.    Hematologic/Lymphatic: Negative for adenopathy.   Skin:  Negative for rash.   Musculoskeletal:  Negative for joint pain and joint swelling.   Gastrointestinal:  Negative for abdominal pain, diarrhea, nausea and vomiting.   Neurological:  Negative for excessive daytime sleepiness, dizziness, focal weakness, light-headedness, numbness and weakness.   Psychiatric/Behavioral:  Negative for depression. The patient does not have insomnia.            Current Outpatient Medications:     amiodarone (PACERONE) 200 MG tablet, Take 1 tablet by mouth Daily., Disp: 90 tablet, Rfl:  3    aspirin 81 MG EC tablet, Take 1 tablet by mouth Daily., Disp: 90 tablet, Rfl: 3    atorvastatin (LIPITOR) 10 MG tablet, Take 1 tablet by mouth Daily., Disp: 90 tablet, Rfl: 3    escitalopram (LEXAPRO) 10 MG tablet, Take 1 tablet by mouth Daily., Disp: , Rfl:     HYDROcodone-acetaminophen (NORCO)  MG per tablet, Take 1 tablet every 8 hours by oral route., Disp: , Rfl:     spironolactone (ALDACTONE) 25 MG tablet, Take 1 tablet by mouth Daily., Disp: 90 tablet, Rfl: 3    Symbicort 160-4.5 MCG/ACT inhaler, , Disp: , Rfl:     carvedilol (COREG) 6.25 MG tablet, Take 1 tablet by mouth 2 (Two) Times a Day., Disp: 180 tablet, Rfl: 3    furosemide (LASIX) 40 MG tablet, Take 1 tablet by mouth Daily., Disp: 90 tablet, Rfl: 3    sacubitril-valsartan (Entresto) 49-51 MG tablet, Take 1 tablet by mouth 2 (Two) Times a Day., Disp: 180 tablet, Rfl: 3       Objective:      Vitals:    12/31/24 0925   BP: 158/88   Pulse: 70   SpO2: 99%     Vitals and nursing note reviewed.   Constitutional:       Appearance: Normal and healthy appearance. Well-developed and not in distress.   Eyes:      Extraocular Movements: Extraocular movements intact.      Pupils: Pupils are equal, round, and reactive to light.   HENT:      Head: Normocephalic and atraumatic.    Mouth/Throat:      Pharynx: Oropharynx is clear.   Neck:      Vascular: JVD normal.      Trachea: Trachea normal.   Pulmonary:      Effort: Pulmonary effort is normal.      Breath sounds: Normal breath sounds. No wheezing. No rhonchi. No rales.   Cardiovascular:      PMI at left midclavicular line. Normal rate. Regular rhythm. Normal S1. Normal S2.       Murmurs: There is a grade 2/6 systolic murmur.      No gallop.  No click. No rub.   Pulses:     Dorsalis pedis: 2+ bilaterally.     Posterior tibial: 2+ bilaterally.  Abdominal:      General: Bowel sounds are normal.      Palpations: Abdomen is soft.      Tenderness: There is no abdominal tenderness.   Musculoskeletal: Normal  range of motion.      Cervical back: Normal range of motion and neck supple. Skin:     General: Skin is warm and dry.      Capillary Refill: Capillary refill takes less than 2 seconds.   Feet:      Right foot:      Skin integrity: Skin integrity normal.      Left foot:      Skin integrity: Skin integrity normal.   Neurological:      Mental Status: Alert and oriented to person, place and time.      Sensory: Sensation is intact.      Motor: Motor function is intact.      Coordination: Coordination is intact.   Psychiatric:         Speech: Speech normal.         Behavior: Behavior is cooperative.       Physical Exam  Vital Signs  Blood pressure is elevated.    Lab Review:         ECG 12 Lead    Date/Time: 1/2/2025 12:17 PM  Performed by: Elton Key DO    Authorized by: Elton Key DO  Comparison: compared with previous ECG   Similar to previous ECG  Rhythm: sinus rhythm  Other findings: non-specific ST-T wave changes and low voltage    Clinical impression: abnormal EKG        Results  Imaging  Ejection fraction last year was in the 40s. Heart cath showed mild disease, no major blockages. Ultrasound in October of last year showed EF still in the 40s and grade 2 diastolic.    Testing  Monitor showed over 3500 episodes of SVT, lot of PACs and PVCs.    Assessment/Plan:     Problem List Items Addressed This Visit       SOB (shortness of breath)    Relevant Orders    Ambulatory Referral to Pulmonology    Mixed hyperlipidemia    Relevant Medications    atorvastatin (LIPITOR) 10 MG tablet    Chronic congestive heart failure - Primary    Relevant Medications    carvedilol (COREG) 6.25 MG tablet    sacubitril-valsartan (Entresto) 49-51 MG tablet    Other Relevant Orders    Adult Transthoracic Echo Complete W/ Cont if Necessary Per Protocol     Assessment & Plan  1. Shortness of breath.  Her ejection fraction was recorded in the 40s last year, indicating a combined systolic and diastolic  dysfunction. A heart catheterization revealed only mild disease with no significant blockages. However, she experienced over 3500 episodes of supraventricular tachycardia (SVT), along with numerous premature atrial contractions (PACs) and premature ventricular contractions (PVCs). An ultrasound conducted in October 2023 confirmed an ejection fraction still in the 40s and grade 2 diastolic dysfunction. The shortness of breath is likely due to her heart's inability to fully stretch out between beats, as it is thick and stiff. She will continue her current regimen of amiodarone 200 mg once daily, baby aspirin, and cholesterol medication. The dosage of Coreg will be increased from 3.125 mg to 6.25 mg twice daily. She will maintain her Entresto therapy, with the dosage increased to 49/51 mg. A referral to a pulmonologist will be made for further evaluation. An ultrasound will be ordered to assess her heart function.    2. Hypertension.  Her blood pressure is not well-controlled. The dosage of Coreg will be increased from 3.125 mg to 6.25 mg twice daily. She will continue her current medications, including amiodarone, baby aspirin, and cholesterol medication.    3. Lower extremity edema.  She reports swelling in her legs, particularly noticeable in the left leg. The dosage of Lasix will be increased to 40 mg once daily, but she is advised to take two doses daily until the swelling subsides. She can take one dose in the morning and one at night to manage the increased urination.    Follow-up  The patient will follow up in 1 year.      Recommendations/plans:    Transcribed from ambient dictation for Elton Key DO by Elton Key DO.  01/02/25   12:16 CST    Patient or patient representative verbalized consent for the use of Ambient Listening during the visit with  Elton Key DO for chart documentation. 1/2/2025  12:18 CST

## 2025-01-17 ENCOUNTER — HOSPITAL ENCOUNTER (OUTPATIENT)
Dept: CARDIOLOGY | Facility: HOSPITAL | Age: 65
Discharge: HOME | End: 2025-01-17
Payer: MEDICARE

## 2025-01-17 VITALS
DIASTOLIC BLOOD PRESSURE: 88 MMHG | BODY MASS INDEX: 40.46 KG/M2 | SYSTOLIC BLOOD PRESSURE: 158 MMHG | HEIGHT: 64 IN | WEIGHT: 237 LBS

## 2025-01-17 DIAGNOSIS — I50.42 CHRONIC COMBINED SYSTOLIC AND DIASTOLIC CONGESTIVE HEART FAILURE: ICD-10-CM

## 2025-01-17 LAB
AV MEAN PRESS GRAD SYS DOP V1V2: 3.6 MMHG
AV VMAX SYS DOP: 133 CM/SEC
BH CV ECHO MEAS - AO MAX PG: 7.1 MMHG
BH CV ECHO MEAS - AO ROOT DIAM: 3.3 CM
BH CV ECHO MEAS - AO V2 VTI: 27.2 CM
BH CV ECHO MEAS - AVA(I,D): 2.21 CM2
BH CV ECHO MEAS - EDV(CUBED): 88.2 ML
BH CV ECHO MEAS - EDV(MOD-SP2): 62.3 ML
BH CV ECHO MEAS - EDV(MOD-SP4): 72 ML
BH CV ECHO MEAS - EF(MOD-SP2): 45.9 %
BH CV ECHO MEAS - EF(MOD-SP4): 45.1 %
BH CV ECHO MEAS - ESV(CUBED): 41.3 ML
BH CV ECHO MEAS - ESV(MOD-SP2): 33.8 ML
BH CV ECHO MEAS - ESV(MOD-SP4): 39.5 ML
BH CV ECHO MEAS - FS: 22.4 %
BH CV ECHO MEAS - IVS/LVPW: 1.12 CM
BH CV ECHO MEAS - IVSD: 1.05 CM
BH CV ECHO MEAS - LA DIMENSION: 4 CM
BH CV ECHO MEAS - LAT PEAK E' VEL: 9 CM/SEC
BH CV ECHO MEAS - LV DIASTOLIC VOL/BSA (35-75): 34.3 CM2
BH CV ECHO MEAS - LV MASS(C)D: 150.3 GRAMS
BH CV ECHO MEAS - LV MAX PG: 2.13 MMHG
BH CV ECHO MEAS - LV MEAN PG: 1.28 MMHG
BH CV ECHO MEAS - LV SYSTOLIC VOL/BSA (12-30): 18.8 CM2
BH CV ECHO MEAS - LV V1 MAX: 73 CM/SEC
BH CV ECHO MEAS - LV V1 VTI: 15.7 CM
BH CV ECHO MEAS - LVIDD: 4.5 CM
BH CV ECHO MEAS - LVIDS: 3.5 CM
BH CV ECHO MEAS - LVOT AREA: 3.8 CM2
BH CV ECHO MEAS - LVOT DIAM: 2.21 CM
BH CV ECHO MEAS - LVPWD: 0.94 CM
BH CV ECHO MEAS - MED PEAK E' VEL: 5 CM/SEC
BH CV ECHO MEAS - MV A MAX VEL: 80.3 CM/SEC
BH CV ECHO MEAS - MV DEC SLOPE: 225.3 CM/SEC2
BH CV ECHO MEAS - MV DEC TIME: 0.26 SEC
BH CV ECHO MEAS - MV E MAX VEL: 59.2 CM/SEC
BH CV ECHO MEAS - MV E/A: 0.74
BH CV ECHO MEAS - MV MAX PG: 2.8 MMHG
BH CV ECHO MEAS - MV MEAN PG: 1.3 MMHG
BH CV ECHO MEAS - MV V2 VTI: 32.8 CM
BH CV ECHO MEAS - MVA(VTI): 1.83 CM2
BH CV ECHO MEAS - PA V2 MAX: 102.1 CM/SEC
BH CV ECHO MEAS - PULM A REVS DUR: 0.13 SEC
BH CV ECHO MEAS - PULM A REVS VEL: 22.6 CM/SEC
BH CV ECHO MEAS - RAP SYSTOLE: 3 MMHG
BH CV ECHO MEAS - RVDD: 3.2 CM
BH CV ECHO MEAS - RVSP: 26.9 MMHG
BH CV ECHO MEAS - SV(LVOT): 60.2 ML
BH CV ECHO MEAS - SV(MOD-SP2): 28.6 ML
BH CV ECHO MEAS - SV(MOD-SP4): 32.5 ML
BH CV ECHO MEAS - SVI(LVOT): 28.6 ML/M2
BH CV ECHO MEAS - SVI(MOD-SP2): 13.6 ML/M2
BH CV ECHO MEAS - SVI(MOD-SP4): 15.4 ML/M2
BH CV ECHO MEAS - TAPSE (>1.6): 2.7 CM
BH CV ECHO MEAS - TR MAX PG: 23.9 MMHG
BH CV ECHO MEAS - TR MAX VEL: 244.3 CM/SEC
BH CV ECHO MEASUREMENTS AVERAGE E/E' RATIO: 8.46
BH CV XLRA - RV BASE: 3 CM
BH CV XLRA - RV LENGTH: 6.7 CM
BH CV XLRA - RV MID: 2.4 CM
BH CV XLRA - TDI S': 14 CM/SEC
IVRT: 76 MS
LEFT ATRIUM VOLUME INDEX: 31 ML/M2
LEFT ATRIUM VOLUME: 63 ML
LV EF BIPLANE MOD: 46 %

## 2025-01-17 PROCEDURE — 93306 TTE W/DOPPLER COMPLETE: CPT

## 2025-01-28 RX ORDER — BENZONATATE 100 MG/1
CAPSULE ORAL
COMMUNITY
Start: 2025-01-06

## 2025-01-28 RX ORDER — ALBUTEROL SULFATE 0.63 MG/3ML
SOLUTION RESPIRATORY (INHALATION)
COMMUNITY

## 2025-01-28 RX ORDER — ALBUTEROL SULFATE 90 UG/1
AEROSOL, METERED RESPIRATORY (INHALATION)
COMMUNITY
Start: 2025-01-06

## 2025-02-03 ENCOUNTER — OFFICE VISIT (OUTPATIENT)
Dept: PULMONOLOGY | Facility: CLINIC | Age: 65
End: 2025-02-03
Payer: MEDICARE

## 2025-02-03 VITALS — HEART RATE: 86 BPM | BODY MASS INDEX: 40.46 KG/M2 | WEIGHT: 237 LBS | OXYGEN SATURATION: 99 % | HEIGHT: 64 IN

## 2025-02-03 DIAGNOSIS — R06.09 CHRONIC DYSPNEA: Primary | ICD-10-CM

## 2025-02-03 DIAGNOSIS — R05.3 CHRONIC COUGH: ICD-10-CM

## 2025-02-03 DIAGNOSIS — I50.22 CHRONIC SYSTOLIC CONGESTIVE HEART FAILURE: ICD-10-CM

## 2025-02-03 DIAGNOSIS — Z87.891 FORMER SMOKER: ICD-10-CM

## 2025-02-03 DIAGNOSIS — Z87.891 PERSONAL HISTORY OF TOBACCO USE, PRESENTING HAZARDS TO HEALTH: ICD-10-CM

## 2025-02-03 PROCEDURE — 99204 OFFICE O/P NEW MOD 45 MIN: CPT | Performed by: INTERNAL MEDICINE

## 2025-02-03 RX ORDER — BUDESONIDE, GLYCOPYRROLATE, AND FORMOTEROL FUMARATE 160; 9; 4.8 UG/1; UG/1; UG/1
2 AEROSOL, METERED RESPIRATORY (INHALATION) 2 TIMES DAILY
Qty: 1 EACH | Refills: 5 | Status: SHIPPED | OUTPATIENT
Start: 2025-02-03

## 2025-02-03 NOTE — PROGRESS NOTES
Clinic Note - New Patient            NAME: Tory Shaw  MRN: [id number]  AGE: 64 y.o.            : 1960  PRIMARY CARE PROVIDER: Demi Aguilera APRN               Reason for Visit:  Tory Shaw presents to clinic today as a new patient for the evaluation of chronic dyspnea.    History of Present Illness:  Mrs. Shaw is a 64-year-old female who presents to clinic today as new patient.  Past medical history includes former tobacco use, CHF.    Patient is accompanied in clinic today by her daughter and great granddaughter.  The patient was referred to pulmonology by cardiology for chronic dyspnea.  Patient states that she has had difficulty with chronic dyspnea for sometimes.  She becomes short of breath with only minimal exertion.  She will become short of breath while short distances around the house.  She does have difficulty with her ADLs due to her dyspnea.  Notes a chronic nonproductive cough.  Also complains of associated wheezing.  She recently underwent a left heart cath which showed nonischemic cardiomyopathy with an EF of 46%.    Denies any prior diagnosis of lung disease.  She does have albuterol as needed at home which she uses nearly every day.  No supplemental oxygen at home.  Former smoker who quit 10 years ago.  Her mother had a history of lung cancer.  No personal history of lung cancer.  No personal family history of VTE.  She was at home which is clean and dry.  She does have a pet dog to which she has no allergies.  No seasonal allergies.    Review of Systems:  A full 12-point review of systems was performed and was negative except as documented in the HPI.               Social History:  Smoking: Quit 10 years ago, >20-pack-year history  Occupation: Housewife  No history of exposure to industrial dusts, fumes, or asbestos.  Pets: Dog, no allergies  Recent travel: None recent  Previous exposure to persons with tuberculosis: None known            Physical Exam:  General: No  acute distress, cooperative.  Head: Atraumatic, normocephalic, normal inspection.  Eyes: EOMI, normal inspection.  ENT: Mucous membranes moist, normal inspection. No thrush.  Heart: RRR, no murmur  Lungs: Clear to auscultation, no wheezing  MSK: No edema or clubbing  GI/abdominal: Soft, nontender.  Neurologic: Alert, oriented.  Cranial nerves II through XII grossly intact.      Imaging Review:  None available for review      Pulmonary Functions Testing Results:  None available for review            Problem List:  Problem List Items Addressed This Visit       Chronic dyspnea - Primary    Chronic congestive heart failure    Former smoker    Chronic cough         Pulmonary Assessment:  Patient is a 64-year-old female with a history of chronic dyspnea.  Given her smoking history concern for COPD and emphysema.  She does qualify for low-dose CT, this has been ordered.  Will start empiric triple therapy with Breztri.  Prescription sent to her pharmacy, as she is not able to fill it is that she will ask for alternatives and let us know.  PFTs prior to follow-up.      Plan:   -Start Breztri, if she is not able to fill this for cost reasons she will ask for alternatives and let the clinic know  -PFTs prior to follow-up  -LDCT  -Declines vaccination           Follow Up:  6 weeks         Thank you Demi Aguilera APRN for this referral and allowing me to participate in this patient's care.           Past Medical History:   Past Medical History:   Diagnosis Date    Asthma     Hyperlipidemia     Hypertension     Migraines     Morbid obesity due to excess calories          Past Surgical History:   Past Surgical History:   Procedure Laterality Date    CARDIAC CATHETERIZATION N/A 8/18/2023    Procedure: Left Heart Cath;  Surgeon: Elton Key DO;  Location:  [facility];  Service: Cardiovascular;  Laterality: N/A;         Family History:   Family History   Family history unknown: Yes        "  Medications:   Prior to Admission medications    Medication Sig Start Date End Date Taking? Authorizing Provider   albuterol (ACCUNEB) 0.63 MG/3ML nebulizer solution    Yes ProviderFranklin MD   amiodarone (PACERONE) 200 MG tablet Take 1 tablet by mouth Daily. 12/31/24  Yes Elton Key DO   aspirin 81 MG EC tablet Take 1 tablet by mouth Daily. 12/31/24  Yes Elton Key DO   atorvastatin (LIPITOR) 10 MG tablet Take 1 tablet by mouth Daily. 12/31/24  Yes Elton Key DO   benzonatate (TESSALON) 100 MG capsule  1/6/25  Yes ProviderFranklin MD   carvedilol (COREG) 6.25 MG tablet Take 1 tablet by mouth 2 (Two) Times a Day. 12/31/24  Yes Elton Key DO   escitalopram (LEXAPRO) 10 MG tablet Take 1 tablet by mouth Daily.   Yes ProviderFranklin MD   furosemide (LASIX) 40 MG tablet Take 1 tablet by mouth Daily. 12/31/24  Yes Elton Key DO   HYDROcodone-acetaminophen (NORCO)  MG per tablet Take 1 tablet every 8 hours by oral route.   Yes ProviderFranklin MD   sacubitril-valsartan (Entresto) 49-51 MG tablet Take 1 tablet by mouth 2 (Two) Times a Day. 12/31/24  Yes Elton Key DO   spironolactone (ALDACTONE) 25 MG tablet Take 1 tablet by mouth Daily. 12/31/24  Yes Elton Key DO   Ventolin  (90 Base) MCG/ACT inhaler  1/6/25  Yes ProviderFranklin MD   Symbicort 160-4.5 MCG/ACT inhaler  4/11/23 2/3/25 Yes ProviderFranklin MD   Budeson-Glycopyrrol-Formoterol (Breztri Aerosphere) 160-9-4.8 MCG/ACT aerosol inhaler Inhale 2 puffs 2 (Two) Times a Day. 2/3/25   Kia Key DO         Allergies:   Patient has no known allergies.      Results Review:             Visit Vitals  Pulse 86   Ht 162.6 cm (64\")   Wt 108 kg (237 lb)   SpO2 99% Comment: RA   Breastfeeding No   BMI 40.68 kg/m²                  Kia Key DO  Pulmonary/Critical Care  2/3/2025  11:09 CST  " Laceration of forehead, initial encounter

## 2025-02-03 NOTE — LETTER
February 3, 2025     Elton Key DO  [address]  [facility] [address]  [facility] [address]    Patient: Tory Shaw   YOB: 1960   Date of Visit: 2/3/2025     Dear Dr. Shahid DO:    Thank you for referring Tory Shaw to me for evaluation. Below are the relevant portions of my assessment and plan of care.    If you have questions, please do not hesitate to call me. I look forward to following Tory along with you.         Sincerely,        Brently Luis Alberto Key DO        CC: No Recipients      Progress Notes:    Clinic Note - New Patient            NAME: oTry Shaw  MRN: [id number]  AGE: 64 y.o.            : 1960  PRIMARY CARE PROVIDER: Demi Aguilera APRN               Reason for Visit:  Tory Shaw presents to clinic today as a new patient for the evaluation of chronic dyspnea.    History of Present Illness:  Mrs. Shaw is a 64-year-old female who presents to clinic today as new patient.  Past medical history includes former tobacco use, CHF.    Patient is accompanied in clinic today by her daughter and great granddaughter.  The patient was referred to pulmonology by cardiology for chronic dyspnea.  Patient states that she has had difficulty with chronic dyspnea for sometimes.  She becomes short of breath with only minimal exertion.  She will become short of breath while short distances around the house.  She does have difficulty with her ADLs due to her dyspnea.  Notes a chronic nonproductive cough.  Also complains of associated wheezing.  She recently underwent a left heart cath which showed nonischemic cardiomyopathy with an EF of 46%.    Denies any prior diagnosis of lung disease.  She does have albuterol as needed at home which she uses nearly every day.  No supplemental oxygen at home.  Former smoker who quit 10 years ago.  Her mother had a history of lung cancer.  No personal history of lung cancer.  No personal family history of VTE.  She was at home  which is clean and dry.  She does have a pet dog to which she has no allergies.  No seasonal allergies.    Review of Systems:  A full 12-point review of systems was performed and was negative except as documented in the HPI.               Social History:  Smoking: Quit 10 years ago, >20-pack-year history  Occupation: Housewife  No history of exposure to industrial dusts, fumes, or asbestos.  Pets: Dog, no allergies  Recent travel: None recent  Previous exposure to persons with tuberculosis: None known            Physical Exam:  General: No acute distress, cooperative.  Head: Atraumatic, normocephalic, normal inspection.  Eyes: EOMI, normal inspection.  ENT: Mucous membranes moist, normal inspection. No thrush.  Heart: RRR, no murmur  Lungs: Clear to auscultation, no wheezing  MSK: No edema or clubbing  GI/abdominal: Soft, nontender.  Neurologic: Alert, oriented.  Cranial nerves II through XII grossly intact.      Imaging Review:  None available for review      Pulmonary Functions Testing Results:  None available for review            Problem List:  Problem List Items Addressed This Visit       Chronic dyspnea - Primary    Chronic congestive heart failure    Former smoker    Chronic cough         Pulmonary Assessment:  Patient is a 64-year-old female with a history of chronic dyspnea.  Given her smoking history concern for COPD and emphysema.  She does qualify for low-dose CT, this has been ordered.  Will start empiric triple therapy with Breztri.  Prescription sent to her pharmacy, as she is not able to fill it is that she will ask for alternatives and let us know.  PFTs prior to follow-up.      Plan:   -Start Breztri, if she is not able to fill this for cost reasons she will ask for alternatives and let the clinic know  -PFTs prior to follow-up  -LDCT  -Declines vaccination           Follow Up:  6 weeks         Thank you Demi Aguilera APRN for this referral and allowing me to participate in this patient's  care.           Past Medical History:   Past Medical History:   Diagnosis Date   • Asthma    • Hyperlipidemia    • Hypertension    • Migraines    • Morbid obesity due to excess calories          Past Surgical History:   Past Surgical History:   Procedure Laterality Date   • CARDIAC CATHETERIZATION N/A 8/18/2023    Procedure: Left Heart Cath;  Surgeon: Elton Key DO;  Location:  [facility] CATH [facility]AS[facility];  Service: Cardiovascular;  Laterality: N/A;         Family History:   Family History   Family history unknown: Yes         Medications:   Prior to Admission medications    Medication Sig Start Date End Date Taking? Authorizing Provider   albuterol (ACCUNEB) 0.63 MG/3ML nebulizer solution    Yes Franklin Diamond MD   amiodarone (PACERONE) 200 MG tablet Take 1 tablet by mouth Daily. 12/31/24  Yes Elton Key DO   aspirin 81 MG EC tablet Take 1 tablet by mouth Daily. 12/31/24  Yes Elton Key DO   atorvastatin (LIPITOR) 10 MG tablet Take 1 tablet by mouth Daily. 12/31/24  Yes Elton Key DO   benzonatate (TESSALON) 100 MG capsule  1/6/25  Yes Franklin Diamond MD   carvedilol (COREG) 6.25 MG tablet Take 1 tablet by mouth 2 (Two) Times a Day. 12/31/24  Yes Elton Key DO   escitalopram (LEXAPRO) 10 MG tablet Take 1 tablet by mouth Daily.   Yes Franklin Diamond MD   furosemide (LASIX) 40 MG tablet Take 1 tablet by mouth Daily. 12/31/24  Yes Elton Key DO   HYDROcodone-acetaminophen (NORCO)  MG per tablet Take 1 tablet every 8 hours by oral route.   Yes Franklin Diamond MD   sacubitril-valsartan (Entresto) 49-51 MG tablet Take 1 tablet by mouth 2 (Two) Times a Day. 12/31/24  Yes Elton Key DO   spironolactone (ALDACTONE) 25 MG tablet Take 1 tablet by mouth Daily. 12/31/24  Yes Elton Key DO   Ventolin  (90 Base) MCG/ACT inhaler  1/6/25  Yes Franklin Diamond MD  "  Symbicort 160-4.5 MCG/ACT inhaler  4/11/23 2/3/25 Yes Provider, MD Franklin   [place]-Glycopyrrol-Formoterol (Breztri Aerosphere) 160-9-4.8 MCG/ACT aerosol inhaler Inhale 2 puffs 2 (Two) Times a Day. 2/3/25   Kia Key DO         Allergies:   Patient has no known allergies.      Results Review:             Visit Vitals  Pulse 86   Ht 162.6 cm (64\")   Wt 108 kg (237 lb)   SpO2 99% Comment: RA   Breastfeeding No   BMI 40.68 kg/m²                  Kia Key DO  Pulmonary/Critical Care  2/3/2025  11:09 CST  "

## 2025-02-28 ENCOUNTER — TELEPHONE (OUTPATIENT)
Dept: PULMONOLOGY | Facility: CLINIC | Age: 65
End: 2025-02-28
Payer: MEDICARE

## 2025-02-28 ENCOUNTER — HOSPITAL ENCOUNTER (OUTPATIENT)
Dept: CT IMAGING | Facility: HOSPITAL | Age: 65
Discharge: HOME | End: 2025-02-28
Payer: MEDICARE

## 2025-02-28 DIAGNOSIS — Z87.891 PERSONAL HISTORY OF TOBACCO USE, PRESENTING HAZARDS TO HEALTH: Primary | ICD-10-CM

## 2025-02-28 DIAGNOSIS — Z87.891 PERSONAL HISTORY OF TOBACCO USE, PRESENTING HAZARDS TO HEALTH: ICD-10-CM

## 2025-02-28 PROCEDURE — 71271 CT THORAX LUNG CANCER SCR C-: CPT

## 2025-02-28 NOTE — TELEPHONE ENCOUNTER
----- Message from Kia Key sent at 2/28/2025  2:53 PM CST -----  Please let the pt know her CT showed no pulm nodules and we will get another low dose screening CT in 1 year. Thanks.

## 2025-03-17 ENCOUNTER — PROCEDURE VISIT (OUTPATIENT)
Dept: PULMONOLOGY | Facility: CLINIC | Age: 65
End: 2025-03-17
Payer: MEDICARE

## 2025-03-17 ENCOUNTER — OFFICE VISIT (OUTPATIENT)
Dept: PULMONOLOGY | Facility: CLINIC | Age: 65
End: 2025-03-17
Payer: MEDICARE

## 2025-03-17 VITALS
HEART RATE: 67 BPM | BODY MASS INDEX: 41.15 KG/M2 | WEIGHT: 241 LBS | OXYGEN SATURATION: 98 % | DIASTOLIC BLOOD PRESSURE: 80 MMHG | HEIGHT: 64 IN | RESPIRATION RATE: 18 BRPM | SYSTOLIC BLOOD PRESSURE: 130 MMHG

## 2025-03-17 DIAGNOSIS — J43.9 PULMONARY EMPHYSEMA, UNSPECIFIED EMPHYSEMA TYPE: Primary | ICD-10-CM

## 2025-03-17 DIAGNOSIS — R06.02 SOB (SHORTNESS OF BREATH): ICD-10-CM

## 2025-03-17 DIAGNOSIS — I50.22 CHRONIC SYSTOLIC CONGESTIVE HEART FAILURE: ICD-10-CM

## 2025-03-17 DIAGNOSIS — R06.09 CHRONIC DYSPNEA: ICD-10-CM

## 2025-03-17 DIAGNOSIS — Z87.891 FORMER SMOKER: ICD-10-CM

## 2025-03-17 DIAGNOSIS — Z87.891 PERSONAL HISTORY OF TOBACCO USE, PRESENTING HAZARDS TO HEALTH: ICD-10-CM

## 2025-03-17 DIAGNOSIS — R60.0 LOWER EXTREMITY EDEMA: ICD-10-CM

## 2025-03-17 PROCEDURE — 99214 OFFICE O/P EST MOD 30 MIN: CPT | Performed by: INTERNAL MEDICINE

## 2025-03-17 RX ORDER — MONTELUKAST SODIUM 10 MG/1
10 TABLET ORAL NIGHTLY
Qty: 30 TABLET | Refills: 5 | Status: SHIPPED | OUTPATIENT
Start: 2025-03-17

## 2025-03-17 NOTE — PROGRESS NOTES
Clinic Note - Follow Up            NAME: Tory Shaw  MRN: [id number]  AGE: 64 y.o.            : 1960  PRIMARY CARE PROVIDER: Demi Aguilera APRN               Reason for Visit:  Tory Shaw presents to clinic today for follow up for the evaluation of dyspnea.    History of Present Illness:  Mrs. Shaw is a 64-year-old female who presents to clinic today as new patient. Past medical history includes former tobacco use, CHF.     The patient was last seen in clinic as a new patient on  for chronic dyspnea.  At that visit she was started on Breztri and as needed albuterol.  Today the patient states that she has been using her Breztri as ordered.  She believes that she was using her albuterol as needed, though it appears she has been using a previous Symbicort prescription as needed.  She will discontinue the Symbicort and start albuterol as needed throughout the day.  She was scheduled to have PFTs today in clinic.  However she was not able to tolerate the PFT procedure and exhale required time.  Since her first appointment the patient underwent a low-dose CT which was negative for pulmonary nodules, did show mild emphysema.    Today the patient notes she continues to have significant dyspnea on exertion.  She requires only minimal exertion to become short of breath.  She has also noted new left lower extremity swelling in the past week.    Review of Systems:  A full 12-point review of systems was performed and was negative except as documented in the HPI.            Physical Exam:  General: No acute distress, cooperative.  Head: Atraumatic, normocephalic, normal inspection.  Eyes: EOMI, normal inspection.  ENT: Mucous membranes moist, normal inspection. No thrush.  Heart: RRR, no murmur  Lungs: Clear to auscultation, no wheezing  MSK: No edema or clubbing  GI/abdominal: Soft, nontender.  Neurologic: Alert, oriented.  Cranial nerves II through XII grossly intact.      Imaging Review:  I  personally reviewed the low-dose CT done 2/28/2025:  Low-dose CT showed mild emphysema, no pulmonary nodules.      Pulmonary Functions Testing Results:  None available for review            Problem List:  Problem List Items Addressed This Visit       SOB (shortness of breath)    Relevant Orders    XR Chest 2 View    Spirometry with Diffusion Capacity & Lung Volumes    Chronic congestive heart failure    Former smoker    Lower extremity edema    Relevant Orders    US Venous Doppler Lower Extremity Bilateral (duplex)    Pulmonary emphysema - Primary    Relevant Medications    montelukast (SINGULAIR) 10 MG tablet         Pulmonary Assessment:  Patient is a 64-year-old female being managed for chronic dyspnea.  She was not able to tolerate PFTs today.  She does have a history of smoking and emphysema on her CT.  Suspect underlying COPD.  Will continue triple inhaled therapy and as needed albuterol.  She was instructed on the use of albuterol versus her Symbicort.  She continues to be dyspneic despite the Breztri.  Given her new lower extremity swelling will check a lower extremity ultrasound.  She also notes seasonal allergies and occasional wheezing, will provide a trial of Singulair.  Two-view chest x-ray for continued dyspnea.  Reattempt PFTs on follow-up.      Plan:   -Continue Breztri and as needed albuterol  -Two-view chest x-ray now  -Lower extremity Doppler given the lower extremity edema  -Right hip PFTs on follow-up  -Alpha-1 antitrypsin on follow-up           Follow Up:  6-8 weeks         Thank you Dmei Aguilera APRN for allowing me to participate in this patient's care.           Past Medical History:   Past Medical History:   Diagnosis Date    Asthma     Hyperlipidemia     Hypertension     Migraines     Morbid obesity due to excess calories          Past Surgical History:   Past Surgical History:   Procedure Laterality Date    CARDIAC CATHETERIZATION N/A 8/18/2023    Procedure: Left Heart Cath;   "Surgeon: Elton Key DO;  Location:  [facility] CATH [facility]VAS[facility] LOC[facility];  Service: Cardiovascular;  Laterality: N/A;         Family History:   Family History   Family history unknown: Yes         Medications:   Prior to Admission medications    Medication Sig Start Date End Date Taking? Authorizing Provider   albuterol (ACCUNEB) 0.63 MG/3ML nebulizer solution    Yes Franklin Diamond MD   amiodarone (PACERONE) 200 MG tablet Take 1 tablet by mouth Daily. 12/31/24  Yes Elton Key DO   aspirin 81 MG EC tablet Take 1 tablet by mouth Daily. 12/31/24  Yes Elton eKy DO   atorvastatin (LIPITOR) 10 MG tablet Take 1 tablet by mouth Daily. 12/31/24  Yes Elton Key DO   benzonatate (TESSALON) 100 MG capsule  1/6/25  Yes Franklin Diamond MD   Budeson-Glycopyrrol-Formoterol (Breztri Aerosphere) 160-9-4.8 MCG/ACT aerosol inhaler Inhale 2 puffs 2 (Two) Times a Day. 2/3/25  Yes Kia Key DO   carvedilol (COREG) 6.25 MG tablet Take 1 tablet by mouth 2 (Two) Times a Day. 12/31/24  Yes Elton Key DO   escitalopram (LEXAPRO) 10 MG tablet Take 1 tablet by mouth Daily.   Yes Franklin Diamond MD   furosemide (LASIX) 40 MG tablet Take 1 tablet by mouth Daily. 12/31/24  Yes Elton Key DO   HYDROcodone-acetaminophen (NORCO)  MG per tablet Take 1 tablet every 8 hours by oral route.   Yes Franklin Diamond MD   sacubitril-valsartan (Entresto) 49-51 MG tablet Take 1 tablet by mouth 2 (Two) Times a Day. 12/31/24  Yes Elton Key DO   spironolactone (ALDACTONE) 25 MG tablet Take 1 tablet by mouth Daily. 12/31/24  Yes Elton Key DO   Ventolin  (90 Base) MCG/ACT inhaler  1/6/25  Yes Franklin Diamond MD         Allergies:   Patient has no known allergies.      Results Review:             Visit Vitals  /80   Pulse 67   Resp 18   Ht 162.6 cm (64\")   Wt 109 kg (241 lb)   SpO2 " 98% Comment: R/A   Breastfeeding No   BMI 41.37 kg/m²           Social History:     Social History     Socioeconomic History    Marital status:    Tobacco Use    Smoking status: Never     Passive exposure: Never    Smokeless tobacco: Never   Vaping Use    Vaping status: Never Used   Substance and Sexual Activity    Alcohol use: Never    Drug use: Never    Sexual activity: Defer                Kia Key DO  Pulmonary/Critical Care  3/17/2025  10:39 CDT

## 2025-03-17 NOTE — PROGRESS NOTES
PFT not performed due to patient's inability to complete maneuver due to unable to completely exhale.  Attempted x 8.  Patient aware no results will be available.

## 2025-03-17 NOTE — LETTER
2025     BALDEV Nunes  [address] [facility] [address]    Patient: Tory Shaw   YOB: 1960   Date of Visit: 3/17/2025     Dear BALDEV Olmos:    Thank you for referring Tory Shaw to me for evaluation. Below are the relevant portions of my assessment and plan of care.    If you have questions, please do not hesitate to call me. I look forward to following Tory along with you.         Sincerely,        Niko Key,         CC: No Recipients      Progress Notes:  PFT not performed due to patient's inability to complete maneuver due to unable to completely exhale.  Attempted x 8.  Patient aware no results will be available.        Clinic Note - Follow Up            NAME: Tory Shaw  MRN: [id number]  AGE: 64 y.o.            : 1960  PRIMARY CARE PROVIDER: Demi Aguilera APRN               Reason for Visit:  Tory Shaw presents to clinic today for follow up for the evaluation of dyspnea.    History of Present Illness:  Mrs. Shaw is a 64-year-old female who presents to clinic today as new patient. Past medical history includes former tobacco use, CHF.     The patient was last seen in clinic as a new patient on  for chronic dyspnea.  At that visit she was started on Breztri and as needed albuterol.  Today the patient states that she has been using her Breztri as ordered.  She believes that she was using her albuterol as needed, though it appears she has been using a previous Symbicort prescription as needed.  She will discontinue the Symbicort and start albuterol as needed throughout the day.  She was scheduled to have PFTs today in clinic.  However she was not able to tolerate the PFT procedure and exhale required time.  Since her first appointment the patient underwent a low-dose CT which was negative for pulmonary nodules, did show mild emphysema.    Today the patient notes she continues to have significant dyspnea on  exertion.  She requires only minimal exertion to become short of breath.  She has also noted new left lower extremity swelling in the past week.    Review of Systems:  A full 12-point review of systems was performed and was negative except as documented in the HPI.            Physical Exam:  General: No acute distress, cooperative.  Head: Atraumatic, normocephalic, normal inspection.  Eyes: EOMI, normal inspection.  ENT: Mucous membranes moist, normal inspection. No thrush.  Heart: RRR, no murmur  Lungs: Clear to auscultation, no wheezing  MSK: No edema or clubbing  GI/abdominal: Soft, nontender.  Neurologic: Alert, oriented.  Cranial nerves II through XII grossly intact.      Imaging Review:  I personally reviewed the low-dose CT done 2/28/2025:  Low-dose CT showed mild emphysema, no pulmonary nodules.      Pulmonary Functions Testing Results:  None available for review            Problem List:  Problem List Items Addressed This Visit       SOB (shortness of breath)    Relevant Orders    XR Chest 2 View    Spirometry with Diffusion Capacity & Lung Volumes    Chronic congestive heart failure    Former smoker    Lower extremity edema    Relevant Orders    US Venous Doppler Lower Extremity Bilateral (duplex)    Pulmonary emphysema - Primary    Relevant Medications    montelukast (SINGULAIR) 10 MG tablet         Pulmonary Assessment:  Patient is a 64-year-old female being managed for chronic dyspnea.  She was not able to tolerate PFTs today.  She does have a history of smoking and emphysema on her CT.  Suspect underlying COPD.  Will continue triple inhaled therapy and as needed albuterol.  She was instructed on the use of albuterol versus her Symbicort.  She continues to be dyspneic despite the Breztri.  Given her new lower extremity swelling will check a lower extremity ultrasound.  She also notes seasonal allergies and occasional wheezing, will provide a trial of Singulair.  Two-view chest x-ray for continued  dyspnea.  Reattempt PFTs on follow-up.      Plan:   -Continue Breztri and as needed albuterol  -Two-view chest x-ray now  -Lower extremity Doppler given the lower extremity edema  -Right hip PFTs on follow-up  -Alpha-1 antitrypsin on follow-up           Follow Up:  6-8 weeks         Thank you Demi Aguilera APRN for allowing me to participate in this patient's care.           Past Medical History:   Past Medical History:   Diagnosis Date   • Asthma    • Hyperlipidemia    • Hypertension    • Migraines    • Morbid obesity due to excess calories          Past Surgical History:   Past Surgical History:   Procedure Laterality Date   • CARDIAC CATHETERIZATION N/A 8/18/2023    Procedure: Left Heart Cath;  Surgeon: Elton Key DO;  Location:  [facility];  Service: Cardiovascular;  Laterality: N/A;         Family History:   Family History   Family history unknown: Yes         Medications:   Prior to Admission medications    Medication Sig Start Date End Date Taking? Authorizing Provider   albuterol (ACCUNEB) 0.63 MG/3ML nebulizer solution    Yes Franklin Diamond MD   amiodarone (PACERONE) 200 MG tablet Take 1 tablet by mouth Daily. 12/31/24  Yes Elton Key DO   aspirin 81 MG EC tablet Take 1 tablet by mouth Daily. 12/31/24  Yes Elton Key DO   atorvastatin (LIPITOR) 10 MG tablet Take 1 tablet by mouth Daily. 12/31/24  Yes Elton Key DO   benzonatate (TESSALON) 100 MG capsule  1/6/25  Yes Franklin Diamond MD   Budeson-Glycopyrrol-Formoterol (Breztri Aerosphere) 160-9-4.8 MCG/ACT aerosol inhaler Inhale 2 puffs 2 (Two) Times a Day. 2/3/25  Yes Kia Key DO   carvedilol (COREG) 6.25 MG tablet Take 1 tablet by mouth 2 (Two) Times a Day. 12/31/24  Yes Elton Key DO   escitalopram (LEXAPRO) 10 MG tablet Take 1 tablet by mouth Daily.   Yes Franklin Diamond MD   furosemide (LASIX) 40 MG tablet Take  "1 tablet by mouth Daily. 12/31/24  Yes Elton Key,    HYDROcodone-acetaminophen (NORCO)  MG per tablet Take 1 tablet every 8 hours by oral route.   Yes ProviderFranklin MD   sacubitril-valsartan (Entresto) 49-51 MG tablet Take 1 tablet by mouth 2 (Two) Times a Day. 12/31/24  Yes Elton Key DO   spironolactone (ALDACTONE) 25 MG tablet Take 1 tablet by mouth Daily. 12/31/24  Yes Elton Key DO   Ventolin  (90 Base) MCG/ACT inhaler  1/6/25  Yes Provider, MD Franklin         Allergies:   Patient has no known allergies.      Results Review:             Visit Vitals  /80   Pulse 67   Resp 18   Ht 162.6 cm (64\")   Wt 109 kg (241 lb)   SpO2 98% Comment: R/A   Breastfeeding No   BMI 41.37 kg/m²           Social History:     Social History     Socioeconomic History   • Marital status:    Tobacco Use   • Smoking status: Never     Passive exposure: Never   • Smokeless tobacco: Never   Vaping Use   • Vaping status: Never Used   Substance and Sexual Activity   • Alcohol use: Never   • Drug use: Never   • Sexual activity: Defer                Kia Key DO  Pulmonary/Critical Care  3/17/2025  10:39 CDT  "

## 2025-03-18 ENCOUNTER — TELEPHONE (OUTPATIENT)
Dept: PULMONOLOGY | Facility: CLINIC | Age: 65
End: 2025-03-18
Payer: MEDICARE

## 2025-03-18 NOTE — TELEPHONE ENCOUNTER
Tried to call patient regarding -x-ray that needs to be scheduled now. Not able to reach nor able to leave voicemail not set up . She will need to call [phone]).

## 2025-04-01 ENCOUNTER — HOSPITAL ENCOUNTER (OUTPATIENT)
Dept: GENERAL RADIOLOGY | Facility: HOSPITAL | Age: 65
Discharge: HOME | End: 2025-04-01
Payer: MEDICARE

## 2025-04-01 ENCOUNTER — RESULTS FOLLOW-UP (OUTPATIENT)
Dept: PULMONOLOGY | Facility: CLINIC | Age: 65
End: 2025-04-01
Payer: MEDICARE

## 2025-04-01 ENCOUNTER — HOSPITAL ENCOUNTER (OUTPATIENT)
Dept: ULTRASOUND IMAGING | Facility: HOSPITAL | Age: 65
Discharge: HOME | End: 2025-04-01
Payer: MEDICARE

## 2025-04-01 DIAGNOSIS — R06.02 SOB (SHORTNESS OF BREATH): ICD-10-CM

## 2025-04-01 DIAGNOSIS — R60.0 LOWER EXTREMITY EDEMA: ICD-10-CM

## 2025-04-01 DIAGNOSIS — R06.09 CHRONIC DYSPNEA: Primary | ICD-10-CM

## 2025-04-01 PROCEDURE — 93970 EXTREMITY STUDY: CPT

## 2025-04-01 PROCEDURE — 71046 X-RAY EXAM CHEST 2 VIEWS: CPT

## 2025-04-01 NOTE — TELEPHONE ENCOUNTER
Spoke with patient regarding test results voiced understanding . Patient is aware that another order for x-ray has been [facility]

## 2025-04-03 RX ORDER — BUDESONIDE, GLYCOPYRROLATE, AND FORMOTEROL FUMARATE 160; 9; 4.8 UG/1; UG/1; UG/1
2 AEROSOL, METERED RESPIRATORY (INHALATION) 2 TIMES DAILY
Qty: 1 EACH | Refills: 5 | Status: SHIPPED | OUTPATIENT
Start: 2025-04-03

## 2025-04-03 NOTE — TELEPHONE ENCOUNTER
Passed protocol   Rx Refill Note  Requested Prescriptions      No prescriptions requested or ordered in this encounter      Last office visit with prescribing clinician: 3/17/2025   Last telemedicine visit with prescribing clinician: Visit date not found   Next office visit with prescribing clinician: 5/12/2025                         Would you like a call back once the refill request has been completed: [] Yes [] No    If the office needs to give you a call back, can they leave a voicemail: [] Yes [] No    Lorenza Encarnacion MA  04/03/25, 11:37 CDT

## 2025-04-03 NOTE — TELEPHONE ENCOUNTER
Spoke with patient regarding test results voiced understanding. Patient is needing a refill on medication Breztri inhaler , taken care of and sent to pharmacy.

## 2025-04-04 ENCOUNTER — TELEPHONE (OUTPATIENT)
Dept: PULMONOLOGY | Facility: CLINIC | Age: 65
End: 2025-04-04
Payer: MEDICARE

## 2025-04-04 NOTE — TELEPHONE ENCOUNTER
Patient daughter Niecy called and stated that medication Breztri is needing PA  per daughter.   PA was started today ,but spoke [place]  pharmacy [facility] stated that [facility], then secondary insurance [employer] medicaid might  the rest of it. Chela stated they will try to run Insurance again on Monday 4/7/25.

## 2025-04-07 ENCOUNTER — TELEPHONE (OUTPATIENT)
Dept: PULMONOLOGY | Facility: CLINIC | Age: 65
End: 2025-04-07
Payer: MEDICARE

## 2025-04-07 NOTE — TELEPHONE ENCOUNTER
Spoke with patient daughter Niecy regarding previous message on 4/4/25 for Breztri medication pharmacy was called again today to see if [employer] insurance went through to cover parts of medication , then Medicaid will  the rest. Pharmacy Chela stated it is still pending for Prior authorization.   I tried to start a whole new PA to see if it would be approved but it was still pending due to patient only having part -B on Medicare, which Part D possible would cover it.     Niecy the daughter was asked does patient have part D which is not in our system, No part -D through insurance. Niecy was asked if she could send front and back of [employer] insurance card through my chart we do not have this information on file. Once we get requested information then can start a new Prior AUTHORIZATION.

## 2025-04-08 ENCOUNTER — TELEPHONE (OUTPATIENT)
Dept: PULMONOLOGY | Facility: CLINIC | Age: 65
End: 2025-04-08
Payer: MEDICARE

## 2025-04-08 NOTE — TELEPHONE ENCOUNTER
FLO CHATMAN (Key: [id number])  PA Case ID #: [id number]  Need Help? Call us at [phone]  Archived today by you  Outcome  Approved today by [employer] MARY 2017  No Prior Authorization is required at this time based on the alternative drug you chose to prescribe.;CaseId:[id number];Status:Cancelled;Explanation:BREZTRI y primePHERE 160-9-4.8 HFA AER AD;Appeal Information:;  Drug  Breztri Aerosphere 160-9-4.8MCG/ACT aerosol  ePA cloud logo  Form  [employer] Commercial Electronic PA Form (2017 NCPDP)

## 2025-04-08 NOTE — TELEPHONE ENCOUNTER
Spoke with patient regarding approval for Breztri medication per [employer] company did prior authorization over the phone today approval #[id number] from 3/9/25 to 4/8/26.   Copay for medication will be $12.15 patient is aware of this information and pharmacy will have prescription ready for  . Patient was advised to let us know if this medication is working or not in few weeks.

## 2025-05-29 ENCOUNTER — TELEPHONE (OUTPATIENT)
Dept: PULMONOLOGY | Facility: CLINIC | Age: 65
End: 2025-05-29
Payer: MEDICARE

## 2025-05-29 NOTE — TELEPHONE ENCOUNTER
Patient daughter Niecy called stating that her mother is having left leg swelling again with little redness/pain to where leg is not able to lift. Niecy stated they called primary office and they said to contact her other doctors to see what need to be done to help . Patient voiced understanding that provider is out of the office but message will be sent to provide.     Patient was advised if gets worse to seek medical attention by going to ER  for further evaluation and treatment.

## 2025-05-30 ENCOUNTER — APPOINTMENT (OUTPATIENT)
Dept: ULTRASOUND IMAGING | Facility: HOSPITAL | Age: 65
End: 2025-05-30
Payer: MEDICARE

## 2025-05-30 ENCOUNTER — HOSPITAL ENCOUNTER (EMERGENCY)
Facility: HOSPITAL | Age: 65
Discharge: HOME | End: 2025-05-31
Attending: EMERGENCY MEDICINE
Payer: MEDICARE

## 2025-05-30 ENCOUNTER — TELEPHONE (OUTPATIENT)
Dept: PULMONOLOGY | Facility: CLINIC | Age: 65
End: 2025-05-30
Payer: MEDICARE

## 2025-05-30 ENCOUNTER — APPOINTMENT (OUTPATIENT)
Dept: GENERAL RADIOLOGY | Facility: HOSPITAL | Age: 65
End: 2025-05-30
Payer: MEDICARE

## 2025-05-30 DIAGNOSIS — M79.605 LEG PAIN, ANTERIOR, LEFT: Primary | ICD-10-CM

## 2025-05-30 PROCEDURE — 99284 EMERGENCY DEPT VISIT MOD MDM: CPT | Performed by: EMERGENCY MEDICINE

## 2025-05-30 PROCEDURE — 93970 EXTREMITY STUDY: CPT

## 2025-05-30 PROCEDURE — 93970 EXTREMITY STUDY: CPT | Performed by: SURGERY

## 2025-05-30 PROCEDURE — 73590 X-RAY EXAM OF LOWER LEG: CPT

## 2025-05-30 RX ORDER — HYDROCODONE BITARTRATE AND ACETAMINOPHEN 5; 325 MG/1; MG/1
1 TABLET ORAL ONCE
Refills: 0 | Status: COMPLETED | OUTPATIENT
Start: 2025-05-30 | End: 2025-05-30

## 2025-05-30 RX ADMIN — HYDROCODONE BITARTRATE AND ACETAMINOPHEN 1 TABLET: 5; 325 TABLET ORAL at 23:07

## 2025-05-30 NOTE — TELEPHONE ENCOUNTER
Spoke with patient and daughter Niecy regarding message from 5/29/25 regarding leg swelling and pain. Per provider he recommends that patient  should be concerned for cellulitis and this should be evaluated at the ED.  Patient has agreed to go to ED for further evaluation /treatment. 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   Niecy stated she will keep us updated on mother symptoms.

## 2025-05-31 VITALS
BODY MASS INDEX: 41.83 KG/M2 | RESPIRATION RATE: 18 BRPM | HEIGHT: 64 IN | TEMPERATURE: 98.2 F | DIASTOLIC BLOOD PRESSURE: 81 MMHG | HEART RATE: 73 BPM | OXYGEN SATURATION: 100 % | WEIGHT: 245 LBS | SYSTOLIC BLOOD PRESSURE: 160 MMHG

## 2025-05-31 NOTE — DISCHARGE INSTRUCTIONS
As discussed please follow-up with your primary care doctor.  Please return to the emergency room with worsening symptoms.

## 2025-05-31 NOTE — ED PROVIDER NOTES
Subjective   History of Present Illness  The patient is a 54-year-old female who presents to the ED with left leg pain and swelling. Per daughter, the leg has been bothering the patient for a few months, with worsening symptoms yesterday including swelling and redness. The patient requires assistance to get in and out of the car due to the leg condition. Medical history is significant for CHF and hypertension. Patient is followed by pulmonology and cardiology. Patient denies any history of DVT, PE, or diabetes. Patient is not on blood thinners. Patient's daughter recalls a fall a few weeks ago. No fever reported.        Review of Systems   All other systems reviewed and are negative.      Past Medical History:   Diagnosis Date    Asthma     Hyperlipidemia     Hypertension     Migraines     Morbid obesity due to excess calories        No Known Allergies    Past Surgical History:   Procedure Laterality Date    CARDIAC CATHETERIZATION N/A 8/18/2023    Procedure: Left Heart Cath;  Surgeon: Elton Key DO;  Location:  [facility];  Service: Cardiovascular;  Laterality: N/A;       Family History   Family history unknown: Yes       Social History     Socioeconomic History    Marital status:    Tobacco Use    Smoking status: Never     Passive exposure: Never    Smokeless tobacco: Never   Vaping Use    Vaping status: Never Used   Substance and Sexual Activity    Alcohol use: Never    Drug use: Never    Sexual activity: Defer           Objective   Physical Exam  General: Patient is not in acute distress.  Appearance: Patient is not diaphoretic.    HENT:  Head: Normocephalic and atraumatic.  Right Ear: External ear normal.  Left Ear: External ear normal.  Nose: Nose normal.    Eyes:  General: No scleral icterus.  Conjunctiva/sclera: Conjunctivae normal.    Cardiovascular:  Rate and Rhythm: Normal rate and regular rhythm.  Heart sounds: No friction rub.  **Pulses: Bilateral lower  extremity pulses strong and bound**    Pulmonary:  Effort: Pulmonary effort is normal. No respiratory distress.  Breath sounds: No stridor. No wheezing.    Musculoskeletal:  **Left Lower Extremity: Swelling noted below the knee, anterior shin swelling present tender to touch  **Mild calf tenderness noted**  **Evidence of bruising present**  Range of Motion: Limited due to pain    Skin:  **Left leg shows mild erythema compared to right**  General: Skin is warm and dry. No rashes present. Normal turgor.    Neurological:  General: No focal deficit present.    Procedures           ED Course  ED Course as of 06/01/25 1902   Fri May 30, 2025   2338 Preliminary report was negative for acute DVT.  X-ray negative for fracture as per my wet read, as well as artificial intelligence interpretation.  The patient is stable for discharge to follow-up with primary care doctor. [SG]   2341 Neurovascular intact, there is no concerns for compartment syndrome.  The patient does take medication for pain at home. [SG]      ED Course User Index  [SG] Jaden Allison MD                                                       Medical Decision Making  Patient presents with left leg pain and swelling with concern for possible DVT versus cellulitis. Although erythema is mild.     Physical exam reveals left leg swelling with mild erythema and bruising. Arterial occlusion ruled out clinically due to presence of strong distal pulses and normal skin color. Previous DVT study from 3-4 months ago was reportedly normal.    Plan:  - X-ray of left leg ordered to evaluate for underlying bony abnormality  - DVT ultrasound ordered due to new onset swelling and pain  - Pain management with oral hydrocodone (patient's usual pain medication)    Patient to follow up with primary care physician after ED evaluation.    Problems Addressed:  Leg pain, anterior, left: complicated acute illness or injury    Amount and/or Complexity of Data Reviewed  Radiology:  ordered.    Risk  Prescription drug management.        Final diagnoses:   Leg pain, anterior, left       ED Disposition  ED Disposition       ED Disposition   Discharge    Condition   Stable    Comment   --               Demi Aguilera, APRN  [address] [facility] [address]  [phone]    Schedule an appointment as soon as possible for a visit            Medication List      No changes were made to your prescriptions during this visit.            Jaden Allison MD  06/01/25 3123

## 2025-06-09 ENCOUNTER — TELEPHONE (OUTPATIENT)
Dept: PULMONOLOGY | Facility: CLINIC | Age: 65
End: 2025-06-09
Payer: MEDICARE

## 2025-06-09 NOTE — TELEPHONE ENCOUNTER
Spoke with patient and her daughter shamika regarding upcoming appointment on 6/23/25 with x-ray that has not been completed yet, provider does want image before appointment to view.

## 2025-06-10 ENCOUNTER — HOSPITAL ENCOUNTER (OUTPATIENT)
Dept: GENERAL RADIOLOGY | Facility: HOSPITAL | Age: 65
Discharge: HOME | End: 2025-06-10
Admitting: INTERNAL MEDICINE
Payer: MEDICARE

## 2025-06-10 DIAGNOSIS — R06.09 CHRONIC DYSPNEA: ICD-10-CM

## 2025-06-10 PROCEDURE — 71046 X-RAY EXAM CHEST 2 VIEWS: CPT

## 2025-06-25 ENCOUNTER — TELEPHONE (OUTPATIENT)
Dept: CARDIOLOGY | Facility: CLINIC | Age: 65
End: 2025-06-25
Payer: MEDICARE

## 2025-06-25 NOTE — TELEPHONE ENCOUNTER
This is a pt of ours. She's been having more SOB. I got an XR on her which showed some edema. She does not see you until Dec, can you work her in sooner?    46 mins  SF  Elton Key, DO  make it happen Tami    29 mins  BF  SF  APT MOVED UP AND LM FOR PT

## 2025-08-07 ENCOUNTER — TELEPHONE (OUTPATIENT)
Dept: PULMONOLOGY | Facility: CLINIC | Age: 65
End: 2025-08-07
Payer: MEDICARE

## 2025-08-14 ENCOUNTER — OFFICE VISIT (OUTPATIENT)
Dept: CARDIOLOGY | Facility: CLINIC | Age: 65
End: 2025-08-14
Payer: MEDICARE

## 2025-08-14 VITALS
DIASTOLIC BLOOD PRESSURE: 80 MMHG | OXYGEN SATURATION: 96 % | HEIGHT: 64 IN | HEART RATE: 68 BPM | SYSTOLIC BLOOD PRESSURE: 132 MMHG | WEIGHT: 241 LBS | BODY MASS INDEX: 41.15 KG/M2

## 2025-08-14 DIAGNOSIS — R00.2 PALPITATIONS: ICD-10-CM

## 2025-08-14 DIAGNOSIS — R40.0 DAYTIME SOMNOLENCE: Primary | ICD-10-CM

## 2025-08-14 DIAGNOSIS — Z87.891 FORMER SMOKER: ICD-10-CM

## 2025-08-14 DIAGNOSIS — I50.42 CHRONIC COMBINED SYSTOLIC AND DIASTOLIC CONGESTIVE HEART FAILURE: ICD-10-CM

## 2025-08-14 DIAGNOSIS — E78.2 MIXED HYPERLIPIDEMIA: ICD-10-CM

## 2025-08-14 PROCEDURE — 99214 OFFICE O/P EST MOD 30 MIN: CPT | Performed by: EMERGENCY MEDICINE

## 2025-08-14 RX ORDER — FUROSEMIDE 40 MG/1
40 TABLET ORAL 2 TIMES DAILY
Qty: 180 TABLET | Refills: 3 | Status: SHIPPED | OUTPATIENT
Start: 2025-08-14

## 2025-08-26 ENCOUNTER — TELEPHONE (OUTPATIENT)
Dept: NEUROLOGY | Age: 65
End: 2025-08-26

## (undated) DEVICE — RADIFOCUS OPTITORQUE ANGIOGRAPHIC CATHETER: Brand: OPTITORQUE

## (undated) DEVICE — PK CATH CARD 30 CA/4

## (undated) DEVICE — SUP ARMBRD ART/LINE BLU

## (undated) DEVICE — TR BAND RADIAL ARTERY COMPRESSION DEVICE: Brand: TR BAND

## (undated) DEVICE — MODEL AT P65, P/N 701554-001KIT CONTENTS: HAND CONTROLLER, 3-WAY HIGH-PRESSURE STOPCOCK WITH ROTATING END AND PREMIUM HIGH-PRESSURE TUBING: Brand: ANGIOTOUCH® KIT

## (undated) DEVICE — PAD, DEFIB, ADULT, RADIOTRANS, PHYSIO: Brand: MEDLINE

## (undated) DEVICE — CANN NASL ETCO2 LO/FLO A/

## (undated) DEVICE — DEV COMP RAD PRELUDESYNC 29CM

## (undated) DEVICE — SOL IRR NACL 0.9PCT BT 1000ML

## (undated) DEVICE — GW STARTER FXD CORE J .035 3X260CM 3MM

## (undated) DEVICE — DRSNG SURESITE WNDW 4X4.5

## (undated) DEVICE — DRAPE,ANGIO,BRACH,STERILE,38X44: Brand: MEDLINE

## (undated) DEVICE — GLIDESHEATH SLENDER STAINLESS STEEL KIT: Brand: GLIDESHEATH SLENDER

## (undated) DEVICE — SOLIDIFIER LIQUI LOC PLUS 2000CC

## (undated) DEVICE — CATH F6INF PIG 145 110CM 6SH: Brand: INFINITI

## (undated) DEVICE — MODEL BT2000 P/N 700287-012KIT CONTENTS: MANIFOLD WITH SALINE AND CONTRAST PORTS, SALINE TUBING WITH SPIKE AND HAND SYRINGE, TRANSDUCER: Brand: BT2000 AUTOMATED MANIFOLD KIT